# Patient Record
Sex: MALE | Race: WHITE | NOT HISPANIC OR LATINO | Employment: OTHER
[De-identification: names, ages, dates, MRNs, and addresses within clinical notes are randomized per-mention and may not be internally consistent; named-entity substitution may affect disease eponyms.]

---

## 2017-01-26 ENCOUNTER — EXTERNAL RECORD (OUTPATIENT)
Dept: HEALTH INFORMATION MANAGEMENT | Facility: OTHER | Age: 57
End: 2017-01-26

## 2017-03-06 ENCOUNTER — BH HISTORICAL (OUTPATIENT)
Dept: OTHER | Age: 57
End: 2017-03-06

## 2017-07-03 ENCOUNTER — BH HISTORICAL (OUTPATIENT)
Dept: OTHER | Age: 57
End: 2017-07-03

## 2017-11-02 ENCOUNTER — BH HISTORICAL (OUTPATIENT)
Dept: OTHER | Age: 57
End: 2017-11-02

## 2018-04-23 ENCOUNTER — OFFICE VISIT (OUTPATIENT)
Dept: INTERNAL MEDICINE CLINIC | Facility: CLINIC | Age: 58
End: 2018-04-23

## 2018-04-23 VITALS
TEMPERATURE: 98 F | RESPIRATION RATE: 16 BRPM | HEART RATE: 72 BPM | WEIGHT: 189.81 LBS | HEIGHT: 73 IN | DIASTOLIC BLOOD PRESSURE: 82 MMHG | BODY MASS INDEX: 25.16 KG/M2 | SYSTOLIC BLOOD PRESSURE: 134 MMHG

## 2018-04-23 DIAGNOSIS — I48.0 PAROXYSMAL ATRIAL FIBRILLATION (HCC): ICD-10-CM

## 2018-04-23 DIAGNOSIS — Z13.220 SCREENING FOR LIPID DISORDERS: ICD-10-CM

## 2018-04-23 DIAGNOSIS — I10 ESSENTIAL HYPERTENSION, BENIGN: ICD-10-CM

## 2018-04-23 DIAGNOSIS — E11.9 TYPE 2 DIABETES MELLITUS WITHOUT COMPLICATION, WITHOUT LONG-TERM CURRENT USE OF INSULIN (HCC): ICD-10-CM

## 2018-04-23 DIAGNOSIS — J45.20 MILD INTERMITTENT ASTHMA WITHOUT COMPLICATION: ICD-10-CM

## 2018-04-23 DIAGNOSIS — I50.9 CONGESTIVE HEART FAILURE, UNSPECIFIED HF CHRONICITY, UNSPECIFIED HEART FAILURE TYPE (HCC): ICD-10-CM

## 2018-04-23 DIAGNOSIS — L50.9 HIVES: Primary | ICD-10-CM

## 2018-04-23 PROCEDURE — 99214 OFFICE O/P EST MOD 30 MIN: CPT | Performed by: NURSE PRACTITIONER

## 2018-04-23 RX ORDER — AMLODIPINE BESYLATE 2.5 MG/1
1 TABLET ORAL DAILY
Refills: 3 | COMMUNITY
Start: 2018-03-25

## 2018-04-23 RX ORDER — HYDRALAZINE HYDROCHLORIDE 50 MG/1
50 TABLET, FILM COATED ORAL 3 TIMES DAILY
COMMUNITY

## 2018-04-23 RX ORDER — BUPROPION HYDROCHLORIDE 200 MG/1
1 TABLET, EXTENDED RELEASE ORAL 2 TIMES DAILY
Refills: 0 | COMMUNITY
Start: 2018-04-11 | End: 2018-05-15

## 2018-04-23 RX ORDER — CETIRIZINE HYDROCHLORIDE 10 MG/1
10 TABLET ORAL DAILY
Qty: 30 TABLET | Refills: 0 | Status: SHIPPED | OUTPATIENT
Start: 2018-04-23 | End: 2018-12-07

## 2018-04-23 RX ORDER — METFORMIN HYDROCHLORIDE 750 MG/1
750 TABLET, EXTENDED RELEASE ORAL
Qty: 30 TABLET | Refills: 1 | Status: SHIPPED | OUTPATIENT
Start: 2018-04-23 | End: 2018-05-15

## 2018-04-23 NOTE — PROGRESS NOTES
Ethan Middleton is a 66year old male who presents as a new patient to establish. HPI:   Pt complains of approx 3 week history of \"welts\" that appear randomly and then resolve spontaneously after approx 4-12 hours.  Stated areas are red, swollen and 06/04/2008 70   ----------  AST (SGOT) (IU/L)   Date Value   05/20/2010 32   04/28/2009 41 (H)   06/04/2008 30   ----------  AST (U/L)   Date Value   07/09/2014 11 (L)   06/23/2014 16   ----------  ALT (SGPT) (IU/L)   Date Value   05/20/2010 52   04/28/2 Infectious Disease Mother      septicemia   • Diabetes Mother    • Stroke Maternal Grandmother    • Cancer Maternal Grandfather      brain   • Heart Disease Maternal Grandfather    • Heart Disorder Brother      TAA   • Thyroid Disorder Sister       Smoking are clear  NECK: supple,no adenopathy,no bruits  LUNGS: clear to auscultation bilaterally w/o wheezes, rhonchi or rales. No cough heard.  Chest expansion symmetrical.   CARDIO: RRR without murmur   GI: normal BS, soft, no masses, HSM or tenderness  MUSCULOS HCl  MG Oral Tablet 24 Hr 30 tablet 1      Sig: Take 1 tablet (750 mg total) by mouth daily with breakfast.           Imaging & Consults:  None    Return in about 3 weeks (around 5/14/2018). There are no Patient Instructions on file for this visit.

## 2018-05-04 ENCOUNTER — APPOINTMENT (OUTPATIENT)
Dept: LAB | Age: 58
End: 2018-05-04
Attending: NURSE PRACTITIONER
Payer: MEDICAID

## 2018-05-04 DIAGNOSIS — Z13.220 SCREENING FOR LIPID DISORDERS: ICD-10-CM

## 2018-05-04 DIAGNOSIS — E11.9 TYPE 2 DIABETES MELLITUS WITHOUT COMPLICATION, WITHOUT LONG-TERM CURRENT USE OF INSULIN (HCC): ICD-10-CM

## 2018-05-04 PROCEDURE — 82043 UR ALBUMIN QUANTITATIVE: CPT

## 2018-05-04 PROCEDURE — 36415 COLL VENOUS BLD VENIPUNCTURE: CPT

## 2018-05-04 PROCEDURE — 83036 HEMOGLOBIN GLYCOSYLATED A1C: CPT

## 2018-05-04 PROCEDURE — 80061 LIPID PANEL: CPT

## 2018-05-04 PROCEDURE — 80053 COMPREHEN METABOLIC PANEL: CPT

## 2018-05-04 PROCEDURE — 82570 ASSAY OF URINE CREATININE: CPT

## 2018-05-06 ENCOUNTER — HOSPITAL ENCOUNTER (EMERGENCY)
Facility: HOSPITAL | Age: 58
Discharge: HOME | End: 2018-05-06
Attending: EMERGENCY MEDICINE
Payer: MEDICAID

## 2018-05-06 VITALS
HEIGHT: 73 IN | HEART RATE: 74 BPM | TEMPERATURE: 98 F | RESPIRATION RATE: 18 BRPM | OXYGEN SATURATION: 94 % | BODY MASS INDEX: 25.16 KG/M2 | SYSTOLIC BLOOD PRESSURE: 169 MMHG | DIASTOLIC BLOOD PRESSURE: 97 MMHG | WEIGHT: 189.81 LBS

## 2018-05-06 DIAGNOSIS — R21 RASH: Primary | ICD-10-CM

## 2018-05-06 DIAGNOSIS — M79.89 SWELLING OF LEFT HAND: ICD-10-CM

## 2018-05-06 PROCEDURE — 99284 EMERGENCY DEPT VISIT MOD MDM: CPT

## 2018-05-06 PROCEDURE — 96365 THER/PROPH/DIAG IV INF INIT: CPT

## 2018-05-06 PROCEDURE — 85025 COMPLETE CBC W/AUTO DIFF WBC: CPT | Performed by: EMERGENCY MEDICINE

## 2018-05-06 RX ORDER — SULFAMETHOXAZOLE AND TRIMETHOPRIM 800; 160 MG/1; MG/1
1 TABLET ORAL 2 TIMES DAILY
Qty: 14 TABLET | Refills: 0 | Status: SHIPPED | OUTPATIENT
Start: 2018-05-06 | End: 2018-05-16

## 2018-05-06 RX ORDER — CEFAZOLIN SODIUM 1 G/50ML
1 INJECTION, SOLUTION INTRAVENOUS ONCE
Status: COMPLETED | OUTPATIENT
Start: 2018-05-06 | End: 2018-05-06

## 2018-05-06 NOTE — ED PROVIDER NOTES
Patient Seen in: [facility] Emergency Department    History   Patient presents with:  Upper Extremity Injury (musculoskeletal)    Stated Complaint: swollen hand    HPI    Russell Fisher is a 53-year-old male presenting to the emergency department for [facility] HEENT exam is normal lungs are clear cardiovascular exam shows regular rhythm abdomen soft nontender patient's left upper extremity exam hand exam edematous slight erythema of dorsum of the hand some blistering skin in the second webspace and at the first tablet Refills: 0

## 2018-05-06 NOTE — ED INITIAL ASSESSMENT (HPI)
Pt w/ L hand swollen and red since yesterday. Denies any type of trauma. Reports having rashes come up and disappear for the last 3 weeks. Pt w/ hx of MRSA 7 years ago.

## 2018-05-15 ENCOUNTER — OFFICE VISIT (OUTPATIENT)
Dept: INTERNAL MEDICINE CLINIC | Facility: CLINIC | Age: 58
End: 2018-05-15

## 2018-05-15 VITALS
DIASTOLIC BLOOD PRESSURE: 80 MMHG | HEIGHT: 75 IN | TEMPERATURE: 98 F | RESPIRATION RATE: 14 BRPM | SYSTOLIC BLOOD PRESSURE: 124 MMHG | WEIGHT: 191 LBS | HEART RATE: 72 BPM | BODY MASS INDEX: 23.75 KG/M2

## 2018-05-15 DIAGNOSIS — L50.9 HIVES: ICD-10-CM

## 2018-05-15 DIAGNOSIS — M79.89 SWELLING OF LEFT HAND: Primary | ICD-10-CM

## 2018-05-15 DIAGNOSIS — F32.A DEPRESSION, UNSPECIFIED DEPRESSION TYPE: ICD-10-CM

## 2018-05-15 DIAGNOSIS — E11.9 TYPE 2 DIABETES MELLITUS WITHOUT COMPLICATION, WITHOUT LONG-TERM CURRENT USE OF INSULIN (HCC): ICD-10-CM

## 2018-05-15 PROCEDURE — 99214 OFFICE O/P EST MOD 30 MIN: CPT | Performed by: NURSE PRACTITIONER

## 2018-05-15 RX ORDER — BUPROPION HYDROCHLORIDE 200 MG/1
200 TABLET, EXTENDED RELEASE ORAL 2 TIMES DAILY
Qty: 60 TABLET | Refills: 0 | Status: SHIPPED | OUTPATIENT
Start: 2018-05-15 | End: 2018-06-17

## 2018-05-15 RX ORDER — METFORMIN HYDROCHLORIDE 750 MG/1
750 TABLET, EXTENDED RELEASE ORAL 2 TIMES DAILY WITH MEALS
Qty: 60 TABLET | Refills: 2 | Status: SHIPPED | OUTPATIENT
Start: 2018-05-15 | End: 2018-11-07

## 2018-05-15 NOTE — PROGRESS NOTES
Patient presents with:  Lab Results: AB RM 7  ER F/U: Pt was seen in ER on 05/06/2018 hand rash       HPI:  Presents for follow up of ER visit on 5/6/18 for swelling of bilat hands, L>R.  Stated left hand started swelling and having red blisters to webbing exertion  MUSCULOSKELETAL: denies back pain  NEURO: denies headaches  PSYCH: as above    Past Medical History:   Diagnosis Date   • ALLERGIC RHINITIS    • Anxiety    • ASTHMA    • Congestive heart disease ([facility] [address]    • DEPRESSION    • DIABETES    • HYPERTENSI 0       Physical Exam  /80   Pulse 72   Temp 97.8 °F (36.6 °C) (Oral)   Resp 14   Ht 75\"   Wt 191 lb   BMI 23.87 kg/m²   Constitutional: well developed, well nourished,in no apparent distress  HEENT: Normocephalic and atraumatic.    Eyes: Conjunctiva Finger stick route daily. Signed Prescriptions Disp Refills    BuPROPion HCl ER, SR, 200 MG Oral Tablet 12 Hr 60 tablet 0      Sig: Take 1 tablet (200 mg total) by mouth 2 (two) times daily.       MetFORMIN HCl  MG Oral Tablet 24 Hr 60 tablet 2

## 2018-06-18 RX ORDER — BUPROPION HYDROCHLORIDE 200 MG/1
TABLET, EXTENDED RELEASE ORAL
Qty: 60 TABLET | Refills: 0 | Status: SHIPPED | OUTPATIENT
Start: 2018-06-18 | End: 2018-07-18

## 2018-06-18 NOTE — TELEPHONE ENCOUNTER
LOV: 05/15/2018  Future Visit: 06/26/2018  Last Labs: 05/04/2018, COMP, Lipid panel, Micro, CBC   Last Rx: 05/15/2018    Per protocol: sent to provider

## 2018-06-20 ENCOUNTER — TELEPHONE (OUTPATIENT)
Dept: INTERNAL MEDICINE CLINIC | Facility: CLINIC | Age: 58
End: 2018-06-20

## 2018-06-25 ENCOUNTER — TELEPHONE (OUTPATIENT)
Dept: INTERNAL MEDICINE CLINIC | Facility: CLINIC | Age: 58
End: 2018-06-25

## 2018-06-25 NOTE — TELEPHONE ENCOUNTER
Received a message via [facility] on 6/23/18 at 3:07 pm stating he cancelled his appointment:    Reason for Cancellation: Canceled via MyChart      Patient Comments:     Mandy Tellez

## 2018-07-18 RX ORDER — BUPROPION HYDROCHLORIDE 200 MG/1
TABLET, EXTENDED RELEASE ORAL
Qty: 60 TABLET | Refills: 0 | Status: SHIPPED | OUTPATIENT
Start: 2018-07-18 | End: 2018-08-23

## 2018-07-18 NOTE — TELEPHONE ENCOUNTER
LOV: 5/15/18 JV  FOV: none on file   LAST RX: 6/18/18 Bupropion HCL ER, 1 tablet by mouth bid 60 tablets 0 refills.   LAST LABS:5/6/18 cbc  5/4/18 microalb,A1C,lipid,cmp  PER PROTOCOL: to provider

## 2018-07-27 NOTE — TELEPHONE ENCOUNTER
ZULMATCSHAQUILLE and sched fup-has only seen JV-does he want to go to [facility] to see him or stay here and see someone else?

## 2018-08-23 RX ORDER — BUPROPION HYDROCHLORIDE 200 MG/1
TABLET, EXTENDED RELEASE ORAL
Qty: 60 TABLET | Refills: 0 | Status: SHIPPED | OUTPATIENT
Start: 2018-08-23 | End: 2018-09-21

## 2018-08-23 NOTE — TELEPHONE ENCOUNTER
LOV: 05/15/2018 JV   Future Visit: 09/12/2018 JL  Last Labs: 05/04/2018 Micro, Hemoglobin A1C, Lipid panel, COMP  Last Rx: 07/18/2018    Per protocol

## 2018-09-21 RX ORDER — BUPROPION HYDROCHLORIDE 200 MG/1
TABLET, EXTENDED RELEASE ORAL
Qty: 60 TABLET | Refills: 0 | Status: SHIPPED | OUTPATIENT
Start: 2018-09-21 | End: 2018-10-26

## 2018-09-21 NOTE — TELEPHONE ENCOUNTER
LOV-5/15/18 JV  FOV-12/10/18 JL  LAST RX-8/23/18 60 tabs 0 refills  LAST LABS-5/4/18  PER PROTOCOL-to provider

## 2018-09-25 NOTE — TELEPHONE ENCOUNTER
Future Appointments   Date Time Provider Tonia Erica   12/10/2018 11:45 AM Julita Alvarado MD [facility]

## 2018-10-26 RX ORDER — BUPROPION HYDROCHLORIDE 200 MG/1
TABLET, EXTENDED RELEASE ORAL
Qty: 60 TABLET | Refills: 1 | Status: SHIPPED | OUTPATIENT
Start: 2018-10-26 | End: 2019-01-22

## 2018-10-26 NOTE — TELEPHONE ENCOUNTER
Last Office Visit: 5-15-18 with Russell Walls for test results  Last Rx Filled: 9-21-18 60 tabs with no refills  Last Labs: 5-6-18 cbc/ 5-4-18 urine microalbumin/hga1c/lipid/cmp  Future Appointment: 12-10-18    Per protocol to provider

## 2018-11-07 RX ORDER — METFORMIN HYDROCHLORIDE 750 MG/1
TABLET, EXTENDED RELEASE ORAL
Qty: 60 TABLET | Refills: 0 | Status: SHIPPED | OUTPATIENT
Start: 2018-11-07 | End: 2019-01-04 | Stop reason: ALTCHOICE

## 2018-11-07 NOTE — TELEPHONE ENCOUNTER
Did not pass protocol- last A1c was 12.2 on 5/4/18  Pt hs upcoming appt on 12/10/18 w/ FABIEN  Per protocol routed to provider

## 2018-12-04 ENCOUNTER — APPOINTMENT (OUTPATIENT)
Dept: LAB | Age: 58
End: 2018-12-04
Attending: NURSE PRACTITIONER
Payer: MEDICAID

## 2018-12-04 DIAGNOSIS — E11.9 TYPE 2 DIABETES MELLITUS WITHOUT COMPLICATION, WITHOUT LONG-TERM CURRENT USE OF INSULIN (HCC): ICD-10-CM

## 2018-12-04 PROCEDURE — 36415 COLL VENOUS BLD VENIPUNCTURE: CPT

## 2018-12-04 PROCEDURE — 83036 HEMOGLOBIN GLYCOSYLATED A1C: CPT

## 2018-12-05 ENCOUNTER — PATIENT MESSAGE (OUTPATIENT)
Dept: INTERNAL MEDICINE CLINIC | Facility: CLINIC | Age: 58
End: 2018-12-05

## 2018-12-05 NOTE — TELEPHONE ENCOUNTER
From: Moo Mederos RN  To: Gricelda Le  Sent: 12/5/2018 9:06 AM CST  Subject: Diabetic Appointment    Hi Irena Jackson,    I apologize for sending a message and not calling but our phone lines are down and we cannot place outgoing nor receive incoming ph

## 2018-12-05 NOTE — TELEPHONE ENCOUNTER
Scheduled pt for Friday 12/7 at 0930 for DM visit per FABIEN. Pt is new to [facility] however appt time was approved by [facility] and also d/w MECCA.

## 2018-12-07 ENCOUNTER — OFFICE VISIT (OUTPATIENT)
Dept: ENDOCRINOLOGY CLINIC | Facility: CLINIC | Age: 58
End: 2018-12-07
Payer: MEDICAID

## 2018-12-07 VITALS
TEMPERATURE: 98 F | WEIGHT: 189 LBS | HEIGHT: 74 IN | SYSTOLIC BLOOD PRESSURE: 120 MMHG | DIASTOLIC BLOOD PRESSURE: 68 MMHG | BODY MASS INDEX: 24.26 KG/M2 | HEART RATE: 68 BPM | RESPIRATION RATE: 20 BRPM

## 2018-12-07 DIAGNOSIS — J30.2 SEASONAL ALLERGIES: ICD-10-CM

## 2018-12-07 DIAGNOSIS — Z23 NEED FOR INFLUENZA VACCINATION: ICD-10-CM

## 2018-12-07 DIAGNOSIS — I10 ESSENTIAL HYPERTENSION, BENIGN: ICD-10-CM

## 2018-12-07 DIAGNOSIS — E11.9 TYPE 2 DIABETES MELLITUS WITHOUT COMPLICATION, WITHOUT LONG-TERM CURRENT USE OF INSULIN (HCC): Primary | ICD-10-CM

## 2018-12-07 PROCEDURE — 90471 IMMUNIZATION ADMIN: CPT | Performed by: NURSE PRACTITIONER

## 2018-12-07 PROCEDURE — 99214 OFFICE O/P EST MOD 30 MIN: CPT | Performed by: NURSE PRACTITIONER

## 2018-12-07 PROCEDURE — 90686 IIV4 VACC NO PRSV 0.5 ML IM: CPT | Performed by: NURSE PRACTITIONER

## 2018-12-07 RX ORDER — CETIRIZINE HYDROCHLORIDE 10 MG/1
10 TABLET ORAL DAILY
Qty: 30 TABLET | Refills: 2 | Status: SHIPPED | OUTPATIENT
Start: 2018-12-07 | End: 2019-08-26

## 2018-12-07 NOTE — PATIENT INSTRUCTIONS
We are here to support you with Diabetes but please remember that you still need your primary care doctor for your routine health maintenance. Your current A1C: 9.6%  This test provides us with your average blood sugar for the past 3 months.    The main g diabetes    Health Maintenance:   1. LABS: It is important to monitor your kidney function (blood and urine protein levels) , liver function tests and cholesterol levels when you have diabetes.      2. FOOT EXAMS:  daily foot inspections for foot wounds or

## 2018-12-07 NOTE — PROGRESS NOTES
HPI:   Jennifer Judd is a 66year old male who presents for his type 2 diabetes. Patient did not bring glucose logs or glucometer for review. Patient states that he has been attempting to control blood glucose with diet.   He is very active with his valeria DIABETES, HYPERTENSION, MRSA (methicillin resistant staph aureus) culture positive (5/2010), Thoracic aneurysm, Type I (juvenile type) diabetes mellitus without mention of complication, not stated as uncontrolled, Umbilical hernia, and Unspecified essentia 05/20/2010 10:00 AM    CA 8.5 05/04/2018 08:43 AM    ALKPHO 100 05/04/2018 08:43 AM    AST 16 05/04/2018 08:43 AM    ALT 25 05/04/2018 08:43 AM    BILT 0.6 05/04/2018 08:43 AM    TP 7.2 05/04/2018 08:43 AM    ALB 3.5 05/04/2018 08:43 AM     05/04/201 compliant, needs to follow diet more regularly. Recommendations are: continue present meds, see ophthalmology soon, check feet daily and will add  Jardiance at a dose of 10mg po daily.     Discussed self monitoring blood glucose and the importance of mo 1/4/2019).        [address] Miguel RANDHAWA, FLORENCIOE

## 2018-12-13 ENCOUNTER — DIABETIC EDUCATION (OUTPATIENT)
Dept: ENDOCRINOLOGY CLINIC | Facility: CLINIC | Age: 58
End: 2018-12-13
Payer: MEDICAID

## 2018-12-13 DIAGNOSIS — E11.9 TYPE 2 DIABETES MELLITUS WITHOUT COMPLICATION, WITHOUT LONG-TERM CURRENT USE OF INSULIN (HCC): Primary | ICD-10-CM

## 2018-12-13 PROCEDURE — 99211 OFF/OP EST MAY X REQ PHY/QHP: CPT | Performed by: DIETITIAN, REGISTERED

## 2018-12-13 NOTE — PROGRESS NOTES
Maynor SAUNDERS 3/7/1960 was seen for Diabetic Medical Nutrition Therapy:    Date: 2018  Start time: 9:00 End time: 9:45    Reviewed diet: he doesn't seem to over-eat starch.     Reviewed HgbA1C and average glucose level as well as fasting gluco

## 2019-01-04 ENCOUNTER — OFFICE VISIT (OUTPATIENT)
Dept: ENDOCRINOLOGY CLINIC | Facility: CLINIC | Age: 59
End: 2019-01-04
Payer: MEDICAID

## 2019-01-04 VITALS
WEIGHT: 185 LBS | DIASTOLIC BLOOD PRESSURE: 80 MMHG | RESPIRATION RATE: 20 BRPM | SYSTOLIC BLOOD PRESSURE: 108 MMHG | HEART RATE: 70 BPM | TEMPERATURE: 98 F | BODY MASS INDEX: 23.74 KG/M2 | HEIGHT: 74 IN

## 2019-01-04 DIAGNOSIS — E11.9 TYPE 2 DIABETES MELLITUS WITHOUT COMPLICATION, WITHOUT LONG-TERM CURRENT USE OF INSULIN (HCC): Primary | ICD-10-CM

## 2019-01-04 PROCEDURE — 99214 OFFICE O/P EST MOD 30 MIN: CPT | Performed by: NURSE PRACTITIONER

## 2019-01-04 RX ORDER — METFORMIN HYDROCHLORIDE 500 MG/1
1000 TABLET, EXTENDED RELEASE ORAL 2 TIMES DAILY WITH MEALS
Qty: 30 TABLET | Refills: 5 | Status: SHIPPED | OUTPATIENT
Start: 2019-01-04 | End: 2019-08-05

## 2019-01-04 NOTE — PROGRESS NOTES
HPI:   Van Holbrook is a 66year old male who presents for recheck of his type 2 diabetes. Patient met with outpatient dietitian for meal planning, portion control and carbohydrate consistency education.   Patient brought glucometer to appointment for has never used smokeless tobacco. He reports that he drinks about 0.6 oz of alcohol per week. He reports that he does not use drugs. He has No Known Allergies.      Current Outpatient Medications on File Prior to Visit:  cetirizine 10 MG Oral Tab Take 1 05/04/2018 08:43 AM    NONHDLC 93 05/04/2018 08:43 AM          Diabetes  (most recent labs)   Lab Results   Component Value Date/Time    A1C 9.6 (H) 12/04/2018 08:57 AM          Microalb (most recent labs)   Lab Results   Component Value Date/Time    MICRO diet more regularly. Recommendations are: see ophthalmology soon, check feet daily and meet with podiatry as ordered last visit. .    Patient to increase Metformin ER to 1000mg po bid and increase Jardiance to 25mg po daily.   Reinforced drinking 6-8 8oz

## 2019-01-07 ENCOUNTER — TELEPHONE (OUTPATIENT)
Dept: ENDOCRINOLOGY CLINIC | Facility: CLINIC | Age: 59
End: 2019-01-07

## 2019-01-07 DIAGNOSIS — E11.9 TYPE 2 DIABETES MELLITUS WITHOUT COMPLICATION, WITHOUT LONG-TERM CURRENT USE OF INSULIN (HCC): Primary | ICD-10-CM

## 2019-01-07 NOTE — TELEPHONE ENCOUNTER
Received fax from [employer] PA started/sent to plan for Jardiance 25 mg Key [id number]. Await response.

## 2019-01-09 NOTE — TELEPHONE ENCOUNTER
Prefer not to prescribe Steglatro due to patients hx of CHF and neuropathy. Patient has some loss of protection senses in lower extremities which ncreases risk of lower extremity amputation if wound occurs.   Please see what information is needed to increa

## 2019-01-09 NOTE — TELEPHONE ENCOUNTER
Faxed appeal letter to [id number]  H .1 C/Tarun Esteban Final for Jardiance denial.  See 'letters' tab. Added Member name, member ID, case record and . Confirmation received.

## 2019-01-23 RX ORDER — BUPROPION HYDROCHLORIDE 200 MG/1
TABLET, EXTENDED RELEASE ORAL
Qty: 60 TABLET | Refills: 1 | Status: SHIPPED | OUTPATIENT
Start: 2019-01-23 | End: 2019-04-22

## 2019-01-23 NOTE — TELEPHONE ENCOUNTER
Called [employer] to [facility] on status of appeal letter which was sent 2 weeks ago on 1/9. They said they have received it and it is still under review. Call reference # [id number]. They will fax us a result when determination has been reached.

## 2019-01-23 NOTE — TELEPHONE ENCOUNTER
LOV: 1/4/19 w/ DH for DM, 5/15/18 w/ JV  FOV: 2/4/19 w/ DH  Last labs: 12/4/18 A1c  Last Refill: 10/26/18 qt:60 1 refill    Per protocol sent to provider

## 2019-02-02 ENCOUNTER — TELEPHONE (OUTPATIENT)
Dept: INTERNAL MEDICINE CLINIC | Facility: CLINIC | Age: 59
End: 2019-02-02

## 2019-02-02 NOTE — TELEPHONE ENCOUNTER
Appointment canceled for Susanna Queen ([id number])   Visit Type: EDIS/MD DIABETIC FOLLOW UP   Date        Time      Length    Provider                  Department   2/4/2019     8:00 AM  45 mins.  Danica Marie APN        [facility]         Reas

## 2019-02-05 NOTE — TELEPHONE ENCOUNTER
Received letter from [facility] requesting that pt sign Authorized [employer] form and mail to them within 15 days or case will be closed.   LM on pt's cell of same and mailed completed form requesting his signature, also enclosed envelope address

## 2019-04-18 ENCOUNTER — EXTERNAL RECORD (OUTPATIENT)
Dept: HEALTH INFORMATION MANAGEMENT | Facility: OTHER | Age: 59
End: 2019-04-18

## 2019-04-23 RX ORDER — BUPROPION HYDROCHLORIDE 200 MG/1
TABLET, EXTENDED RELEASE ORAL
Qty: 60 TABLET | Refills: 0 | Status: SHIPPED | OUTPATIENT
Start: 2019-04-23 | End: 2019-06-19

## 2019-04-23 NOTE — TELEPHONE ENCOUNTER
LOV:1/4/19   FOV:none on file   LAST RX:1/23/19 200 mg take 1 tab twice a day 60 tabs 1 refill   LAST LABS:12/4/18 a1c  PER PROTOCOL:to provider

## 2019-04-23 NOTE — TELEPHONE ENCOUNTER
I have never seen pt, I do not see that he has ever had pe here, please schedule asap with me or IM apc

## 2019-06-19 ENCOUNTER — TELEPHONE (OUTPATIENT)
Dept: INTERNAL MEDICINE CLINIC | Facility: CLINIC | Age: 59
End: 2019-06-19

## 2019-06-19 DIAGNOSIS — Z13.0 SCREENING FOR DISORDER OF BLOOD AND BLOOD-FORMING ORGANS: ICD-10-CM

## 2019-06-19 DIAGNOSIS — Z00.00 ROUTINE GENERAL MEDICAL EXAMINATION AT A HEALTH CARE FACILITY: Primary | ICD-10-CM

## 2019-06-19 DIAGNOSIS — Z13.220 SCREENING FOR LIPID DISORDERS: ICD-10-CM

## 2019-06-19 DIAGNOSIS — Z13.228 SCREENING FOR METABOLIC DISORDER: ICD-10-CM

## 2019-06-19 DIAGNOSIS — Z12.5 SCREENING FOR MALIGNANT NEOPLASM OF PROSTATE: ICD-10-CM

## 2019-06-19 RX ORDER — BUPROPION HYDROCHLORIDE 200 MG/1
TABLET, EXTENDED RELEASE ORAL
Qty: 60 TABLET | Refills: 1 | Status: SHIPPED | OUTPATIENT
Start: 2019-06-19 | End: 2019-08-28

## 2019-06-19 NOTE — TELEPHONE ENCOUNTER
Last Office Visit: 1-4-19 with [facility] for diabetes  Last Rx Filled: 4-23-19 60 tabs with no refills   Last Labs: 12-4-18 hga1c. 5-6-18 cbc. 5-4-18 cmp/lipid/hga1c/urine microalbumin  Future Appointment: 8-5-19    Per protocol to provider

## 2019-06-19 NOTE — TELEPHONE ENCOUNTER
Please place fasting lab orders through [facility] for upcoming physcial  Future Appointments   Date Time Provider Tonia Maldonado   8/5/2019  2:45 PM Familia Freeman MD [facility]

## 2019-07-26 ENCOUNTER — LAB ENCOUNTER (OUTPATIENT)
Dept: LAB | Age: 59
End: 2019-07-26
Attending: INTERNAL MEDICINE
Payer: MEDICAID

## 2019-07-26 DIAGNOSIS — Z00.00 ROUTINE GENERAL MEDICAL EXAMINATION AT A HEALTH CARE FACILITY: ICD-10-CM

## 2019-07-26 DIAGNOSIS — E11.9 TYPE 2 DIABETES MELLITUS WITHOUT COMPLICATION, WITHOUT LONG-TERM CURRENT USE OF INSULIN (HCC): Primary | ICD-10-CM

## 2019-07-26 DIAGNOSIS — Z13.228 SCREENING FOR METABOLIC DISORDER: ICD-10-CM

## 2019-07-26 DIAGNOSIS — Z12.5 SCREENING FOR MALIGNANT NEOPLASM OF PROSTATE: ICD-10-CM

## 2019-07-26 DIAGNOSIS — Z13.220 SCREENING FOR LIPID DISORDERS: ICD-10-CM

## 2019-07-26 DIAGNOSIS — Z13.0 SCREENING FOR DISORDER OF BLOOD AND BLOOD-FORMING ORGANS: ICD-10-CM

## 2019-07-26 LAB
ALBUMIN SERPL-MCNC: 3.5 G/DL (ref 3.4–5)
ALBUMIN/GLOB SERPL: 0.9 {RATIO} (ref 1–2)
ALP LIVER SERPL-CCNC: 99 U/L (ref 45–117)
ALT SERPL-CCNC: 14 U/L (ref 16–61)
ANION GAP SERPL CALC-SCNC: 4 MMOL/L (ref 0–18)
AST SERPL-CCNC: 13 U/L (ref 15–37)
BASOPHILS # BLD AUTO: 0.05 X10(3) UL (ref 0–0.2)
BASOPHILS NFR BLD AUTO: 0.7 %
BILIRUB SERPL-MCNC: 0.6 MG/DL (ref 0.1–2)
BUN BLD-MCNC: 16 MG/DL (ref 7–18)
BUN/CREAT SERPL: 11.9 (ref 10–20)
CALCIUM BLD-MCNC: 9 MG/DL (ref 8.5–10.1)
CHLORIDE SERPL-SCNC: 100 MMOL/L (ref 98–112)
CHOLEST SMN-MCNC: 135 MG/DL (ref ?–200)
CO2 SERPL-SCNC: 30 MMOL/L (ref 21–32)
COMPLEXED PSA SERPL-MCNC: 1.23 NG/ML (ref ?–4)
CREAT BLD-MCNC: 1.34 MG/DL (ref 0.7–1.3)
DEPRECATED RDW RBC AUTO: 36.9 FL (ref 35.1–46.3)
EOSINOPHIL # BLD AUTO: 0.27 X10(3) UL (ref 0–0.7)
EOSINOPHIL NFR BLD AUTO: 3.6 %
ERYTHROCYTE [DISTWIDTH] IN BLOOD BY AUTOMATED COUNT: 11.5 % (ref 11–15)
GLOBULIN PLAS-MCNC: 3.7 G/DL (ref 2.8–4.4)
GLUCOSE BLD-MCNC: 336 MG/DL (ref 70–99)
HCT VFR BLD AUTO: 47.7 % (ref 39–53)
HDLC SERPL-MCNC: 43 MG/DL (ref 40–59)
HGB BLD-MCNC: 17.1 G/DL (ref 13–17.5)
IMM GRANULOCYTES # BLD AUTO: 0.02 X10(3) UL (ref 0–1)
IMM GRANULOCYTES NFR BLD: 0.3 %
LDLC SERPL CALC-MCNC: 70 MG/DL (ref ?–100)
LYMPHOCYTES # BLD AUTO: 2.21 X10(3) UL (ref 1–4)
LYMPHOCYTES NFR BLD AUTO: 29.5 %
M PROTEIN MFR SERPL ELPH: 7.2 G/DL (ref 6.4–8.2)
MCH RBC QN AUTO: 31.4 PG (ref 26–34)
MCHC RBC AUTO-ENTMCNC: 35.8 G/DL (ref 31–37)
MCV RBC AUTO: 87.5 FL (ref 80–100)
MONOCYTES # BLD AUTO: 0.45 X10(3) UL (ref 0.1–1)
MONOCYTES NFR BLD AUTO: 6 %
NEUTROPHILS # BLD AUTO: 4.48 X10 (3) UL (ref 1.5–7.7)
NEUTROPHILS # BLD AUTO: 4.48 X10(3) UL (ref 1.5–7.7)
NEUTROPHILS NFR BLD AUTO: 59.9 %
NONHDLC SERPL-MCNC: 92 MG/DL (ref ?–130)
OSMOLALITY SERPL CALC.SUM OF ELEC: 292 MOSM/KG (ref 275–295)
PLATELET # BLD AUTO: 220 10(3)UL (ref 150–450)
POTASSIUM SERPL-SCNC: 4.6 MMOL/L (ref 3.5–5.1)
RBC # BLD AUTO: 5.45 X10(6)UL (ref 4.3–5.7)
SODIUM SERPL-SCNC: 134 MMOL/L (ref 136–145)
TRIGL SERPL-MCNC: 112 MG/DL (ref 30–149)
VLDLC SERPL CALC-MCNC: 22 MG/DL (ref 0–30)
WBC # BLD AUTO: 7.5 X10(3) UL (ref 4–11)

## 2019-07-26 PROCEDURE — 85025 COMPLETE CBC W/AUTO DIFF WBC: CPT

## 2019-07-26 PROCEDURE — 80061 LIPID PANEL: CPT

## 2019-07-26 PROCEDURE — 36415 COLL VENOUS BLD VENIPUNCTURE: CPT

## 2019-07-26 PROCEDURE — 80053 COMPREHEN METABOLIC PANEL: CPT

## 2019-08-05 ENCOUNTER — OFFICE VISIT (OUTPATIENT)
Dept: INTERNAL MEDICINE CLINIC | Facility: CLINIC | Age: 59
End: 2019-08-05
Payer: MEDICAID

## 2019-08-05 VITALS
HEART RATE: 72 BPM | HEIGHT: 73.43 IN | SYSTOLIC BLOOD PRESSURE: 136 MMHG | RESPIRATION RATE: 18 BRPM | DIASTOLIC BLOOD PRESSURE: 84 MMHG | WEIGHT: 181 LBS | TEMPERATURE: 98 F | BODY MASS INDEX: 23.48 KG/M2

## 2019-08-05 DIAGNOSIS — Z12.11 SPECIAL SCREENING FOR MALIGNANT NEOPLASMS, COLON: ICD-10-CM

## 2019-08-05 DIAGNOSIS — F32.A DEPRESSION, UNSPECIFIED DEPRESSION TYPE: ICD-10-CM

## 2019-08-05 DIAGNOSIS — E11.9 TYPE 2 DIABETES MELLITUS WITHOUT COMPLICATION, WITHOUT LONG-TERM CURRENT USE OF INSULIN (HCC): Primary | ICD-10-CM

## 2019-08-05 DIAGNOSIS — Z00.00 ROUTINE GENERAL MEDICAL EXAMINATION AT A HEALTH CARE FACILITY: ICD-10-CM

## 2019-08-05 DIAGNOSIS — I10 ESSENTIAL HYPERTENSION, BENIGN: ICD-10-CM

## 2019-08-05 LAB
CARTRIDGE LOT#: 981 NUMERIC
HEMOGLOBIN A1C: 13.7 % (ref 4.3–5.6)

## 2019-08-05 PROCEDURE — 83036 HEMOGLOBIN GLYCOSYLATED A1C: CPT | Performed by: INTERNAL MEDICINE

## 2019-08-05 PROCEDURE — 99396 PREV VISIT EST AGE 40-64: CPT | Performed by: INTERNAL MEDICINE

## 2019-08-05 RX ORDER — PEN NEEDLE, DIABETIC 30 GX3/16"
1 NEEDLE, DISPOSABLE MISCELLANEOUS DAILY
Qty: 100 EACH | Refills: 0 | Status: SHIPPED | OUTPATIENT
Start: 2019-08-05 | End: 2019-08-20 | Stop reason: ALTCHOICE

## 2019-08-05 RX ORDER — METFORMIN HYDROCHLORIDE 500 MG/1
1000 TABLET, EXTENDED RELEASE ORAL 2 TIMES DAILY WITH MEALS
Qty: 120 TABLET | Refills: 0 | Status: SHIPPED | OUTPATIENT
Start: 2019-08-05 | End: 2019-08-20

## 2019-08-05 NOTE — PROGRESS NOTES
Instructed pt on 10 units Lantus with sample pen. Gave sample pen needles. Discussed give long-acting insulin the same time every day, site rotation, storage and expiration of insulin, prime each dose with 2 units, sharp disposal and BG targets.     Pen nee diabetes mellitus without complication, without long-term current use of insulin (hcc)  (primary encounter diagnosis)  Depression, unspecified depression type  Special screening for malignant neoplasms, colon    Orders Placed This Encounter      Hgb A1C

## 2019-08-06 ENCOUNTER — OFFICE VISIT (OUTPATIENT)
Dept: ENDOCRINOLOGY CLINIC | Facility: CLINIC | Age: 59
End: 2019-08-06
Payer: MEDICAID

## 2019-08-06 ENCOUNTER — PATIENT MESSAGE (OUTPATIENT)
Dept: ENDOCRINOLOGY CLINIC | Facility: CLINIC | Age: 59
End: 2019-08-06

## 2019-08-06 VITALS
BODY MASS INDEX: 23.74 KG/M2 | HEART RATE: 70 BPM | RESPIRATION RATE: 20 BRPM | DIASTOLIC BLOOD PRESSURE: 80 MMHG | WEIGHT: 183 LBS | SYSTOLIC BLOOD PRESSURE: 100 MMHG | HEIGHT: 73.5 IN

## 2019-08-06 DIAGNOSIS — E11.65 TYPE 2 DIABETES MELLITUS WITH HYPERGLYCEMIA, WITH LONG-TERM CURRENT USE OF INSULIN (HCC): Primary | ICD-10-CM

## 2019-08-06 DIAGNOSIS — Z79.4 TYPE 2 DIABETES MELLITUS WITH HYPERGLYCEMIA, WITH LONG-TERM CURRENT USE OF INSULIN (HCC): Primary | ICD-10-CM

## 2019-08-06 PROCEDURE — 95249 CONT GLUC MNTR PT PROV EQP: CPT | Performed by: NURSE PRACTITIONER

## 2019-08-06 PROCEDURE — 99214 OFFICE O/P EST MOD 30 MIN: CPT | Performed by: NURSE PRACTITIONER

## 2019-08-06 RX ORDER — METFORMIN HYDROCHLORIDE 750 MG/1
TABLET, EXTENDED RELEASE ORAL
Refills: 0 | COMMUNITY
Start: 2019-04-23 | End: 2019-08-06

## 2019-08-06 RX ORDER — FLASH GLUCOSE SENSOR
KIT MISCELLANEOUS
Qty: 2 EACH | Refills: 1 | Status: SHIPPED | OUTPATIENT
Start: 2019-08-06 | End: 2020-07-21

## 2019-08-06 RX ORDER — GLIMEPIRIDE 4 MG/1
TABLET ORAL
Qty: 30 TABLET | Refills: 2 | Status: SHIPPED | OUTPATIENT
Start: 2019-08-06 | End: 2019-08-20

## 2019-08-06 NOTE — ASSESSMENT & PLAN NOTE
A1C :   Lab Results   Component Value Date     (H) 12/04/2018    A1C 13.7 (A) 08/05/2019     Weight: 183 lb   Diabetes control is poor .   Recommendations:       Reviewed w patient pathophysiology of diabetes, clinical signifiance of A1c, adverse eff

## 2019-08-06 NOTE — PROGRESS NOTES
HPI:    Patient ID: Guilherme Love is a 64year old male.     HPI  Here for pe, way overdue for uncontrolled dm, stopped meds, not checking sugars, needs new psychiatrist for detpresison, doing ok, feels ok  /84 (BP Location: Right arm, Patient Posi Rfl:    spironolactone (ALDACTONE) 25 MG Oral Tab Take 0.5 tablets (12.5 mg total) by mouth daily. Disp: 45 tablet Rfl: 0   lisinopril (PRINIVIL,ZESTRIL) 40 MG Oral Tab Take 1 tablet (40 mg total) by mouth once daily.  Disp: 90 tablet Rfl: 0   carvedilol (C [E]      Meds This Visit:  Requested Prescriptions     Signed Prescriptions Disp Refills   • metFORMIN HCl  MG Oral Tablet 24 Hr 120 tablet 0     Sig: Take 2 tablets (1,000 mg total) by mouth 2 (two) times daily with meals.    • insulin glargine (LANT

## 2019-08-06 NOTE — PATIENT INSTRUCTIONS
We are here to support you with Diabetes but please remember that you still need your primary care doctor for your routine health maintenance. Your A1C: 13.7%   We will work together on improving your blood sugar trends.    I think your beta cells are in sugar testing:    We will see if the continuous glucose sensor covered   But in meantime, we have a free reader and 1 14 d sensor   The continuous glucose sensor has been placed on the back of your arm to measures and stores your sugar levels in the device EVERY year since these changes occur in the BACK of the eye and not visible by you. Please let me know if you need provider list for eye doctor.    Thank you   Vikas Muhammad  [phone]

## 2019-08-06 NOTE — PROGRESS NOTES
Patient presents with:  Diabetes: room-17 cf. pt forgot meter. HPI:   Lesli Duffy is a 64year old male presenting for type  DM medication management.  In the past 3m, DM control has been poorly controlled as evidenced by A1C 13.7% on 8-5-2019   Admits he l known allergies.     Past Medical History:   Diagnosis Date   • ALLERGIC RHINITIS    • Anxiety    • ASTHMA    • Congestive heart disease (HCC)    • DEPRESSION    • DIABETES    • HYPERTENSION    • MRSA (methicillin resistant staph aureus) culture positive 5/ TWICE DAILY Disp: 60 tablet Rfl: 1   cetirizine 10 MG Oral Tab Take 1 tablet (10 mg total) by mouth daily. Disp: 30 tablet Rfl: 2   Glucose Blood (MARIAH CONTOUR NEXT TEST) In Vitro Strip 1 strip by Finger stick route daily.  Disp: 100 strip Rfl: 1   AmLODIP Skin: Skin is warm and dry. Psychiatric: He has a normal mood and affect.  His behavior is normal.       Assessment/Plan:  CHF (congestive heart failure) ([facility] [address]  Will see if insurance will cover jardiance rx       Type 2 diabetes mellitus with hyperglyce in detail  Patient will benefit from lifestyle modifications (dietary modifications, exercise and weight loss)     Recommended for  patient to follow up in Diabetes center to review carb goals, food label reading, and offer further support/guidance with ex each          Refill:  1      DM Quality Indicators:  A1C as reported above  Retinopathy Screen: Needed. Nephropathy Screen: needed; cannot collect urine.    Continue ACEi  Depression Screen:  PHQ-2 NEG   Feet exam: Reviewed feet precautions PCP 8-5-2019

## 2019-08-07 ENCOUNTER — TELEPHONE (OUTPATIENT)
Dept: INTERNAL MEDICINE CLINIC | Facility: CLINIC | Age: 59
End: 2019-08-07

## 2019-08-07 NOTE — TELEPHONE ENCOUNTER
From: Jacob Perez  To: ADAM Hogan  Sent: 8/6/2019 8:51 PM CDT  Subject: Visit Follow-up Question    Johnny Reyes,    It was nice to meet you today. I am not sure if this is the norm but my blood glucose level is 109.  I am not sure if this

## 2019-08-07 NOTE — TELEPHONE ENCOUNTER
LOV: 8/6/19  Next OV: 8/20/19  Per note:  MEDICATIONS:   Continue Lantus 10 units   If by Wednesday your blood sugar is still above 150: increase to 12 units once daily      Start glimepiride 4mg once daily in AM with breakfast   Continue Metformin

## 2019-08-07 NOTE — TELEPHONE ENCOUNTER
PA for Freestyle 14 day sensor started via cover my meds: Key: [id number] – Rx #: [id number]. Per [facility] [id number].

## 2019-08-12 ENCOUNTER — TELEPHONE (OUTPATIENT)
Dept: INTERNAL MEDICINE CLINIC | Facility: CLINIC | Age: 59
End: 2019-08-12

## 2019-08-12 NOTE — TELEPHONE ENCOUNTER
Started PA ok per CAB key- [id number]      Your information has been submitted to [employer] is reviewing the PA request and you will receive an electronic response.  You may check for the updated outcome later by reopening this request. The st

## 2019-08-13 NOTE — TELEPHONE ENCOUNTER
Received fax \"PA request\" from [employer]. It was basically asking the same questions covered in a PA. Fax confirmation received. Filed in folder.

## 2019-08-14 NOTE — TELEPHONE ENCOUNTER
Called [employer]. They did not receive the whole document and asked us to re-send. 13 pages sent. Fax confirmation received. Paperwork saved in file.

## 2019-08-15 NOTE — TELEPHONE ENCOUNTER
Received fax from [employer]: request for Trevino sensor denied. Pt must be taking 3 or more insulin injections/day or on a pump. He also must be testing BG's QID for at least 2 months. To appeal fax letter to [phone]. The phone # [phone].

## 2019-08-20 ENCOUNTER — OFFICE VISIT (OUTPATIENT)
Dept: ENDOCRINOLOGY CLINIC | Facility: CLINIC | Age: 59
End: 2019-08-20
Payer: MEDICAID

## 2019-08-20 ENCOUNTER — TELEPHONE (OUTPATIENT)
Dept: INTERNAL MEDICINE CLINIC | Facility: CLINIC | Age: 59
End: 2019-08-20

## 2019-08-20 VITALS
SYSTOLIC BLOOD PRESSURE: 120 MMHG | WEIGHT: 185.63 LBS | RESPIRATION RATE: 16 BRPM | DIASTOLIC BLOOD PRESSURE: 80 MMHG | HEIGHT: 74 IN | HEART RATE: 76 BPM | BODY MASS INDEX: 23.82 KG/M2

## 2019-08-20 DIAGNOSIS — E11.9 TYPE 2 DIABETES MELLITUS WITHOUT COMPLICATION, WITHOUT LONG-TERM CURRENT USE OF INSULIN (HCC): ICD-10-CM

## 2019-08-20 DIAGNOSIS — E11.65 TYPE 2 DIABETES MELLITUS WITH HYPERGLYCEMIA, WITHOUT LONG-TERM CURRENT USE OF INSULIN (HCC): Primary | ICD-10-CM

## 2019-08-20 PROCEDURE — 95251 CONT GLUC MNTR ANALYSIS I&R: CPT | Performed by: NURSE PRACTITIONER

## 2019-08-20 PROCEDURE — 99214 OFFICE O/P EST MOD 30 MIN: CPT | Performed by: NURSE PRACTITIONER

## 2019-08-20 RX ORDER — GLIMEPIRIDE 2 MG/1
TABLET ORAL
Qty: 90 TABLET | COMMUNITY
Start: 2019-08-20 | End: 2020-02-03 | Stop reason: SDUPTHER

## 2019-08-20 RX ORDER — METFORMIN HYDROCHLORIDE 500 MG/1
500 TABLET, EXTENDED RELEASE ORAL 2 TIMES DAILY WITH MEALS
Qty: 180 TABLET | Refills: 0 | COMMUNITY
Start: 2019-08-20 | End: 2020-07-03

## 2019-08-20 NOTE — TELEPHONE ENCOUNTER
Called pt to remind him about the lab he has tried here in the office but is never successful pt verbalize he understands and will get lab done.

## 2019-08-20 NOTE — PROGRESS NOTES
Patient presents with:  Diabetes: Room 17-, pt has readings on phone and some on meter      HPI:   Juan Manuel Doonvan is a 64year old male presenting for type  DM medication management. In the past 2 weeks his BG trends have improved.  He had to stop the Lantus due 07/26/2019    GFRAA 68 07/26/2019       SMBG patterns: Contour  meter   Testing BG x  2 daily  Meter or log book today: no    DM associated symptoms:   Polyuria, polyphagia, polydipsia: no  Paresthesias: no  Blurred vision: no  Hypoglycemia: no    DM Compl ([facility]) Does not apply Misc Apply 1 sensor to skin every 14 days Disp: 2 each Rfl: 1   BUPROPION HCL ER, SR, 200 MG Oral Tablet 12 Hr TAKE 1 TABLET(200 MG) BY MOUTH TWICE DAILY Disp: 60 tablet Rfl: 1   cetirizine 10 MG Oral Tab Take well-developed and well-nourished. No distress. Cardiovascular: Normal rate. Edema not present. Pulmonary/Chest: Effort normal.   Neurological: He is alert and oriented to person, place, and time. Skin: Skin is warm and dry.    Psychiatric: He has total) by mouth 2 (two) times daily with meals.           Dispense:  180 tablet          Refill:  0      glimepiride 2 MG Oral Tab          Si mg daily          Dispense:  90 tablet      DM Quality Indicators:  A1C as reported above  Retinopathy Screen:

## 2019-08-20 NOTE — PATIENT INSTRUCTIONS
Your trends are much better    Ok to stay off Lantus - you do not need it anymore   Continue:    Metformin  XR 500m tabs twice daily     Decrease  glimepiride 4mg to 2mg (cut tablet in half) and still take in the morning.      Fluctuations in blood suga

## 2019-08-20 NOTE — TELEPHONE ENCOUNTER
Please call pt- he is due  for urine microalb ; lab was ordered. He can have this done at anytime. The order is in [address] so any [facility] lab will work.

## 2019-08-21 ENCOUNTER — APPOINTMENT (OUTPATIENT)
Dept: LAB | Age: 59
End: 2019-08-21
Attending: INTERNAL MEDICINE
Payer: MEDICAID

## 2019-08-21 DIAGNOSIS — E11.65 TYPE 2 DIABETES MELLITUS WITH HYPERGLYCEMIA, WITHOUT LONG-TERM CURRENT USE OF INSULIN (HCC): ICD-10-CM

## 2019-08-21 LAB
CREAT UR-SCNC: 183 MG/DL
MICROALBUMIN UR-MCNC: 3.87 MG/DL
MICROALBUMIN/CREAT 24H UR-RTO: 21.1 UG/MG (ref ?–30)

## 2019-08-21 PROCEDURE — 82570 ASSAY OF URINE CREATININE: CPT

## 2019-08-21 PROCEDURE — 82043 UR ALBUMIN QUANTITATIVE: CPT

## 2019-08-26 ENCOUNTER — OFFICE VISIT (OUTPATIENT)
Dept: INTERNAL MEDICINE CLINIC | Facility: CLINIC | Age: 59
End: 2019-08-26
Payer: MEDICAID

## 2019-08-26 VITALS
SYSTOLIC BLOOD PRESSURE: 136 MMHG | WEIGHT: 186 LBS | RESPIRATION RATE: 18 BRPM | DIASTOLIC BLOOD PRESSURE: 74 MMHG | TEMPERATURE: 98 F | HEART RATE: 68 BPM | BODY MASS INDEX: 23.87 KG/M2 | HEIGHT: 74 IN | OXYGEN SATURATION: 98 %

## 2019-08-26 DIAGNOSIS — E11.65 TYPE 2 DIABETES MELLITUS WITH HYPERGLYCEMIA, WITHOUT LONG-TERM CURRENT USE OF INSULIN (HCC): Primary | ICD-10-CM

## 2019-08-26 DIAGNOSIS — J30.2 SEASONAL ALLERGIES: ICD-10-CM

## 2019-08-26 PROCEDURE — 99213 OFFICE O/P EST LOW 20 MIN: CPT | Performed by: INTERNAL MEDICINE

## 2019-08-26 RX ORDER — CETIRIZINE HYDROCHLORIDE 10 MG/1
10 TABLET ORAL DAILY
Qty: 30 TABLET | Refills: 2 | Status: SHIPPED | OUTPATIENT
Start: 2019-08-26 | End: 2020-01-02

## 2019-08-27 NOTE — PROGRESS NOTES
HPI:    Patient ID: Jacob Perez is a 64year old male.     HPI  Here to fu on dm, htn, seasonal allergies, sugars better, didn't tolerate high dose metformin  /74   Pulse 68   Temp 98.1 °F (36.7 °C) (Oral)   Resp 18   Ht 74\"   Wt 186 lb   SpO2 9 and well-nourished. HENT:   Head: Normocephalic and atraumatic. Eyes: Pupils are equal, round, and reactive to light. Cardiovascular: Normal rate, regular rhythm and normal heart sounds. No murmur heard.   Pulmonary/Chest: Effort normal and breath s

## 2019-08-28 RX ORDER — BUPROPION HYDROCHLORIDE 200 MG/1
TABLET, EXTENDED RELEASE ORAL
Qty: 180 TABLET | Refills: 0 | Status: SHIPPED | OUTPATIENT
Start: 2019-08-28 | End: 2019-12-25

## 2019-08-28 NOTE — TELEPHONE ENCOUNTER
Last Office Visit: 8-26-19 with FABIEN for medication follow up  Last Rx Filled: 6-19-19 60 tabs with 1 refill   Last Labs: 8-21-19 urine microalbumin. 8-5-19 hga1c.   Future Appointment: 1-13-20    Per protocol to provider

## 2019-09-09 ENCOUNTER — TELEPHONE (OUTPATIENT)
Dept: ENDOCRINOLOGY CLINIC | Facility: CLINIC | Age: 59
End: 2019-09-09

## 2019-09-17 DIAGNOSIS — E11.9 TYPE 2 DIABETES MELLITUS WITHOUT COMPLICATION, WITHOUT LONG-TERM CURRENT USE OF INSULIN (HCC): ICD-10-CM

## 2019-09-17 RX ORDER — METFORMIN HYDROCHLORIDE 500 MG/1
TABLET, EXTENDED RELEASE ORAL
Qty: 120 TABLET | Refills: 0 | Status: SHIPPED | OUTPATIENT
Start: 2019-09-17 | End: 2019-12-25

## 2019-09-18 NOTE — TELEPHONE ENCOUNTER
We have not received the stool cards I gave him in aug, can you please call him and find out if he did them, if not, do asap

## 2019-09-18 NOTE — TELEPHONE ENCOUNTER
Spoke with Marni Wooten, pt states he has yet to complete FOBT card. Pt notified to complete test ASAP. FWD to SURINDER CONN. Michelle Arrington

## 2019-10-28 ENCOUNTER — TELEPHONE (OUTPATIENT)
Dept: ENDOCRINOLOGY CLINIC | Facility: CLINIC | Age: 59
End: 2019-10-28

## 2019-10-28 DIAGNOSIS — E11.65 TYPE 2 DIABETES MELLITUS WITH HYPERGLYCEMIA, WITHOUT LONG-TERM CURRENT USE OF INSULIN (HCC): Primary | ICD-10-CM

## 2019-10-28 NOTE — TELEPHONE ENCOUNTER
Called pt per CAB to let him know to get his a1c done no need to fast. Pt agree and will get lab done.

## 2019-12-24 DIAGNOSIS — E11.9 TYPE 2 DIABETES MELLITUS WITHOUT COMPLICATION, WITHOUT LONG-TERM CURRENT USE OF INSULIN (HCC): ICD-10-CM

## 2019-12-24 NOTE — TELEPHONE ENCOUNTER
Metformin protocol failed due to Last HgBA1C < 7.5    Please advise,    LOV:8/26/19 JL  FOV:1/13/20  LAST RX:  Metformin: 9/17/19 500 mg 2 tabs twice a day 120 tabs 0 refills     Buproprion: 8/28/19 200 mg 1 tab twice a day 180 tabs 0 refills     LAST LABS

## 2019-12-25 RX ORDER — METFORMIN HYDROCHLORIDE 500 MG/1
TABLET, EXTENDED RELEASE ORAL
Qty: 120 TABLET | Refills: 0 | Status: SHIPPED | OUTPATIENT
Start: 2019-12-25 | End: 2020-03-17

## 2019-12-25 RX ORDER — BUPROPION HYDROCHLORIDE 200 MG/1
TABLET, EXTENDED RELEASE ORAL
Qty: 180 TABLET | Refills: 0 | Status: SHIPPED | OUTPATIENT
Start: 2019-12-25 | End: 2020-03-06

## 2020-01-01 DIAGNOSIS — J30.2 SEASONAL ALLERGIES: ICD-10-CM

## 2020-01-02 RX ORDER — CETIRIZINE HYDROCHLORIDE 10 MG/1
10 TABLET ORAL DAILY
Qty: 90 TABLET | Refills: 1 | Status: SHIPPED | OUTPATIENT
Start: 2020-01-02

## 2020-02-03 RX ORDER — GLIMEPIRIDE 2 MG/1
TABLET ORAL
Qty: 30 TABLET | Refills: 0 | Status: SHIPPED | OUTPATIENT
Start: 2020-02-03 | End: 2020-03-06

## 2020-02-03 NOTE — TELEPHONE ENCOUNTER
Approved 30 d rx for glimepiride only     Message on rx to make appt for DM - [facility]

## 2020-03-06 ENCOUNTER — TELEPHONE (OUTPATIENT)
Dept: INTERNAL MEDICINE CLINIC | Facility: CLINIC | Age: 60
End: 2020-03-06

## 2020-03-06 RX ORDER — GLIMEPIRIDE 2 MG/1
TABLET ORAL
Qty: 30 TABLET | Refills: 0 | Status: SHIPPED | OUTPATIENT
Start: 2020-03-06 | End: 2020-04-21

## 2020-03-06 RX ORDER — BUPROPION HYDROCHLORIDE 200 MG/1
TABLET, EXTENDED RELEASE ORAL
Qty: 30 TABLET | Refills: 0 | Status: SHIPPED | OUTPATIENT
Start: 2020-03-06 | End: 2020-06-30

## 2020-03-06 NOTE — TELEPHONE ENCOUNTER
Last Ov: 8/26/19, JL, DM med f/u  Upcoming appt: no upcoming appt  Last labs: microalb/creat 8/21/19  Last Rx: bupropion ER 200mg, #180, 0R 12/25/19    Per Protocol, not on protocol. Rx pending.

## 2020-03-06 NOTE — TELEPHONE ENCOUNTER
Medication(s) to Refill:   Requested Prescriptions     Pending Prescriptions Disp Refills   • GLIMEPIRIDE 2 MG Oral Tab [Pharmacy Med Name: GLIMEPIRIDE 2MG TABLETS] 30 tablet 0     Sig: TAKE 1 TABLET BY MOUTH DAILY       Last Time Medication was Filled:  2

## 2020-03-17 DIAGNOSIS — E11.9 TYPE 2 DIABETES MELLITUS WITHOUT COMPLICATION, WITHOUT LONG-TERM CURRENT USE OF INSULIN (HCC): ICD-10-CM

## 2020-03-17 RX ORDER — METFORMIN HYDROCHLORIDE 500 MG/1
TABLET, EXTENDED RELEASE ORAL
Qty: 120 TABLET | Refills: 0 | Status: SHIPPED | OUTPATIENT
Start: 2020-03-17 | End: 2020-07-03 | Stop reason: SDUPTHER

## 2020-03-17 NOTE — TELEPHONE ENCOUNTER
Called pt to schedule f/u visit for 3/23/20 7:30am        FYI to [facility].

## 2020-03-17 NOTE — TELEPHONE ENCOUNTER
Last VISIT 08/26/19    Last REFILL 12/25/19 qty 120 w/0 refills    Last LABS 08/21/19 Microalb/creat done    Future Appointments   Date Time Provider Tonia Maldonado   3/23/2020  7:30 AM Georgia Xie MD [facility]         Per PROTOCOL?  Maryse Enciso

## 2020-04-21 RX ORDER — GLIMEPIRIDE 2 MG/1
TABLET ORAL
Qty: 30 TABLET | Refills: 0 | Status: SHIPPED | OUTPATIENT
Start: 2020-04-21 | End: 2020-06-23

## 2020-05-12 ENCOUNTER — TELEPHONE (OUTPATIENT)
Dept: ENDOCRINOLOGY CLINIC | Facility: CLINIC | Age: 60
End: 2020-05-12

## 2020-05-12 NOTE — TELEPHONE ENCOUNTER
Diabetes Provider: saw CAB in Aug and was to f/u in 2 months. LMTCB and sched phone/video visit if refills desired.

## 2020-06-05 NOTE — TELEPHONE ENCOUNTER
Numerous attempts have been made to schedule either an in-person, video or phone follow-up with CAB. Letter will be sent today that if we do not hear from the pt by 6/24, diabetes care will be returned to pcp.

## 2020-06-23 ENCOUNTER — TELEPHONE (OUTPATIENT)
Dept: INTERNAL MEDICINE CLINIC | Facility: CLINIC | Age: 60
End: 2020-06-23

## 2020-06-23 ENCOUNTER — PATIENT MESSAGE (OUTPATIENT)
Dept: ENDOCRINOLOGY CLINIC | Facility: CLINIC | Age: 60
End: 2020-06-23

## 2020-06-23 RX ORDER — GLIMEPIRIDE 2 MG/1
2 TABLET ORAL DAILY
Qty: 30 TABLET | Refills: 0 | Status: SHIPPED | OUTPATIENT
Start: 2020-06-23 | End: 2020-07-27

## 2020-06-23 RX ORDER — GLIMEPIRIDE 2 MG/1
TABLET ORAL
Qty: 30 TABLET | Refills: 0 | OUTPATIENT
Start: 2020-06-23

## 2020-06-23 NOTE — TELEPHONE ENCOUNTER
From: Kevin Clark  To: ADAM Varner  Sent: 6/23/2020 4:37 PM CDT  Subject: Prescription Question    Please approve refill

## 2020-06-23 NOTE — TELEPHONE ENCOUNTER
Future Appointments   Date Time Provider Tonia Maldonado   9/4/2020  9:00 Ankur Chisholm MD [facility]     Orders to  [facility]    Pt informed that labs need to be completed no sooner than 2 weeks prior to the appt.  Pt aware to fast-no call

## 2020-06-29 NOTE — TELEPHONE ENCOUNTER
Future Appointments   Date Time Provider [facility] Erica   7/21/2020  8:30 AM ADAM Zimmer [facility]   9/4/2020  9:00 AM Akash Wang MD [facility]

## 2020-06-30 RX ORDER — BUPROPION HYDROCHLORIDE 200 MG/1
200 TABLET, EXTENDED RELEASE ORAL 2 TIMES DAILY
Qty: 30 TABLET | Refills: 0 | Status: SHIPPED | OUTPATIENT
Start: 2020-06-30 | End: 2020-08-10

## 2020-06-30 NOTE — TELEPHONE ENCOUNTER
Last Ov: 8/26/19, JL, med F/U (DM f/u)  Last labs: Microalb/creat 8/21/19  Last Rx: bupropion ER 200mg, #30, 0R 3/6/20    Future Appointments   Date Time Provider Tonia Maldonado   7/21/2020  8:30 AM ADAM Phelan [facility] [place]   9/4

## 2020-07-03 DIAGNOSIS — E11.9 TYPE 2 DIABETES MELLITUS WITHOUT COMPLICATION, WITHOUT LONG-TERM CURRENT USE OF INSULIN (HCC): ICD-10-CM

## 2020-07-03 RX ORDER — METFORMIN HYDROCHLORIDE 500 MG/1
500 TABLET, EXTENDED RELEASE ORAL 2 TIMES DAILY WITH MEALS
Qty: 180 TABLET | Refills: 0 | Status: SHIPPED | OUTPATIENT
Start: 2020-07-03 | End: 2021-01-06

## 2020-07-03 NOTE — TELEPHONE ENCOUNTER
Medication(s) to Refill:   Requested Prescriptions     Pending Prescriptions Disp Refills   • metFORMIN HCl  MG Oral Tablet 24 Hr 180 tablet 0     Sig: Take 1 tablet (500 mg total) by mouth 2 (two) times daily with meals.            Last Time Jade Lugo

## 2020-07-21 ENCOUNTER — OFFICE VISIT (OUTPATIENT)
Dept: ENDOCRINOLOGY CLINIC | Facility: CLINIC | Age: 60
End: 2020-07-21
Payer: MEDICAID

## 2020-07-21 ENCOUNTER — APPOINTMENT (OUTPATIENT)
Dept: LAB | Age: 60
End: 2020-07-21
Attending: NURSE PRACTITIONER
Payer: MEDICAID

## 2020-07-21 VITALS
BODY MASS INDEX: 24.51 KG/M2 | OXYGEN SATURATION: 98 % | DIASTOLIC BLOOD PRESSURE: 72 MMHG | HEIGHT: 74 IN | TEMPERATURE: 97 F | WEIGHT: 191 LBS | HEART RATE: 75 BPM | SYSTOLIC BLOOD PRESSURE: 114 MMHG

## 2020-07-21 DIAGNOSIS — E11.65 TYPE 2 DIABETES MELLITUS WITH HYPERGLYCEMIA, WITHOUT LONG-TERM CURRENT USE OF INSULIN (HCC): ICD-10-CM

## 2020-07-21 DIAGNOSIS — Z00.00 PERIODIC HEALTH ASSESSMENT, GENERAL SCREENING, ADULT: ICD-10-CM

## 2020-07-21 DIAGNOSIS — E11.9 TYPE 2 DIABETES MELLITUS WITHOUT COMPLICATION, WITHOUT LONG-TERM CURRENT USE OF INSULIN (HCC): ICD-10-CM

## 2020-07-21 DIAGNOSIS — F41.9 ANXIETY: ICD-10-CM

## 2020-07-21 DIAGNOSIS — E11.65 TYPE 2 DIABETES MELLITUS WITH HYPERGLYCEMIA, WITHOUT LONG-TERM CURRENT USE OF INSULIN (HCC): Primary | ICD-10-CM

## 2020-07-21 DIAGNOSIS — S91.301A OPEN WOUND OF RIGHT HEEL, INITIAL ENCOUNTER: ICD-10-CM

## 2020-07-21 LAB
ALBUMIN SERPL-MCNC: 3.4 G/DL (ref 3.4–5)
ALBUMIN/GLOB SERPL: 0.9 {RATIO} (ref 1–2)
ALP LIVER SERPL-CCNC: 84 U/L (ref 45–117)
ALT SERPL-CCNC: 21 U/L (ref 16–61)
ANION GAP SERPL CALC-SCNC: 5 MMOL/L (ref 0–18)
AST SERPL-CCNC: 15 U/L (ref 15–37)
BILIRUB SERPL-MCNC: 1 MG/DL (ref 0.1–2)
BUN BLD-MCNC: 13 MG/DL (ref 7–18)
BUN/CREAT SERPL: 10.2 (ref 10–20)
CALCIUM BLD-MCNC: 8.4 MG/DL (ref 8.5–10.1)
CARTRIDGE LOT#: 638 NUMERIC
CHLORIDE SERPL-SCNC: 104 MMOL/L (ref 98–112)
CHOLEST SMN-MCNC: 135 MG/DL (ref ?–200)
CO2 SERPL-SCNC: 30 MMOL/L (ref 21–32)
COMPLEXED PSA SERPL-MCNC: 1.43 NG/ML (ref ?–4)
CREAT BLD-MCNC: 1.28 MG/DL (ref 0.7–1.3)
EST. AVERAGE GLUCOSE BLD GHB EST-MCNC: 192 MG/DL (ref 68–126)
GLOBULIN PLAS-MCNC: 3.6 G/DL (ref 2.8–4.4)
GLUCOSE BLD-MCNC: 166 MG/DL (ref 70–99)
GLUCOSE BLOOD: 162
HBA1C MFR BLD HPLC: 8.3 % (ref ?–5.7)
HDLC SERPL-MCNC: 43 MG/DL (ref 40–59)
HEMOGLOBIN A1C: 8.1 % (ref 4.3–5.6)
LDLC SERPL CALC-MCNC: 82 MG/DL (ref ?–100)
M PROTEIN MFR SERPL ELPH: 7 G/DL (ref 6.4–8.2)
NONHDLC SERPL-MCNC: 92 MG/DL (ref ?–130)
OSMOLALITY SERPL CALC.SUM OF ELEC: 292 MOSM/KG (ref 275–295)
PATIENT FASTING Y/N/NP: YES
POTASSIUM SERPL-SCNC: 3.6 MMOL/L (ref 3.5–5.1)
SODIUM SERPL-SCNC: 139 MMOL/L (ref 136–145)
TEST STRIP LOT #: NORMAL NUMERIC
TRIGL SERPL-MCNC: 50 MG/DL (ref 30–149)
TSI SER-ACNC: 0.96 MIU/ML (ref 0.36–3.74)
VIT D+METAB SERPL-MCNC: 14.8 NG/ML (ref 30–100)
VLDLC SERPL CALC-MCNC: 10 MG/DL (ref 0–30)

## 2020-07-21 PROCEDURE — 3074F SYST BP LT 130 MM HG: CPT | Performed by: NURSE PRACTITIONER

## 2020-07-21 PROCEDURE — 80061 LIPID PANEL: CPT

## 2020-07-21 PROCEDURE — 83036 HEMOGLOBIN GLYCOSYLATED A1C: CPT | Performed by: NURSE PRACTITIONER

## 2020-07-21 PROCEDURE — 3078F DIAST BP <80 MM HG: CPT | Performed by: NURSE PRACTITIONER

## 2020-07-21 PROCEDURE — 80053 COMPREHEN METABOLIC PANEL: CPT

## 2020-07-21 PROCEDURE — 82306 VITAMIN D 25 HYDROXY: CPT

## 2020-07-21 PROCEDURE — 83036 HEMOGLOBIN GLYCOSYLATED A1C: CPT

## 2020-07-21 PROCEDURE — 3008F BODY MASS INDEX DOCD: CPT | Performed by: NURSE PRACTITIONER

## 2020-07-21 PROCEDURE — 84443 ASSAY THYROID STIM HORMONE: CPT

## 2020-07-21 PROCEDURE — 82962 GLUCOSE BLOOD TEST: CPT | Performed by: NURSE PRACTITIONER

## 2020-07-21 PROCEDURE — 99215 OFFICE O/P EST HI 40 MIN: CPT | Performed by: NURSE PRACTITIONER

## 2020-07-21 RX ORDER — ERGOCALCIFEROL 1.25 MG/1
50000 CAPSULE ORAL WEEKLY
Qty: 12 CAPSULE | Refills: 0 | Status: SHIPPED | OUTPATIENT
Start: 2020-07-21 | End: 2020-10-07

## 2020-07-21 NOTE — PATIENT INSTRUCTIONS
Dr Dyllan Thrasher- foot doctor    [phone]  [address] [facility], [address]  Phone: [phone]      Your trends are better. 8.1% (down from 13.7%)     This medication will remove extra sugar out of blood into your urine.    It can be dosed anytime water. These low levels would not be expected to lead to an increase in the risk of cancer. However, sustained higher levels of exposure may increase the risk of cancer in humans.     FDA is alerting patients and health care professionals to five companies' specific metformin was affected, the pharmacy will contact you and substitute an alternative. I am happy to discuss this further with you if you want. Please note the above info was taken directly from FDA website.   You are welcome to review more of this

## 2020-07-21 NOTE — PROGRESS NOTES
Patient presents with:  Diabetes: room-16 cf. pt forgot meter. HPI:   Sara Gu is a 64year old male presenting for type 2 DM medication management.   Last DM appt 8-2019   In the past 3m DM control has improved to 8.1% (last A1C 13.7%)   Sent pt to lab 25OH, Total      30.0 - 100.0 ng/mL 27.7 (L)     DM Complications:  Microvascular:   Neuropathy: no  Retinopathy: no  Nephropathy: no    Macrovascular:  PVD: no  CAD: yes HX CHF, + CAD   Stroke/CVA: no    Modifying factors:  Medication adherence: yes,   Ga tablet 0   • cetirizine 10 MG Oral Tab Take 1 tablet (10 mg total) by mouth daily. 90 tablet 1   • Glucose Blood (MARIAH CONTOUR NEXT TEST) In Vitro Strip 1 strip by Finger stick route daily.  100 strip 1   • AmLODIPine Besylate 2.5 MG Oral Tab Take 1 tablet normal. Musculoskeletal:      Comments: Diabetes foot exam:   Good foot hygiene. Bilateral barefoot skin diabetic exam: R foot heel wound: 3 cm depressed area . Slight erythema. No exudate.      L foot skin  normal.  Visualized feet and the appearance : m daily   Reminded about s/s and tx hypoglycemia   Reviewed clinical signifiance of A1c, adverse effects of suboptimal glucose control, and goals of therapy discussed with pt in detail  Continue lifestyle modifications (dietary modifications, exercise and we statin rx.  Labs 7-2019 - collected today   Dentist: recommended every 6 months      VIT D DEF  Component      Latest Ref Rng & Units 7/21/2020   Vitamin D, 25OH, Total      30.0 - 100.0 ng/mL 14.8 (L)   rx VIT D: Ergocalciferol 50, 000 units once weekly fo

## 2020-07-23 ENCOUNTER — EXTERNAL RECORD (OUTPATIENT)
Dept: HEALTH INFORMATION MANAGEMENT | Facility: OTHER | Age: 60
End: 2020-07-23

## 2020-07-27 RX ORDER — GLIMEPIRIDE 2 MG/1
TABLET ORAL
Qty: 30 TABLET | Refills: 0 | Status: SHIPPED | OUTPATIENT
Start: 2020-07-27 | End: 2020-10-06

## 2020-07-27 NOTE — TELEPHONE ENCOUNTER
Medication(s) to Refill:   Requested Prescriptions     Pending Prescriptions Disp Refills   • GLIMEPIRIDE 2 MG Oral Tab [Pharmacy Med Name: GLIMEPIRIDE 2MG TABLETS] 30 tablet 0     Sig: TAKE 1 TABLET(2 MG) BY MOUTH DAILY           Last Time Medication was

## 2020-07-30 ENCOUNTER — TELEPHONE (OUTPATIENT)
Dept: ENDOCRINOLOGY CLINIC | Facility: CLINIC | Age: 60
End: 2020-07-30

## 2020-08-10 RX ORDER — BUPROPION HYDROCHLORIDE 200 MG/1
200 TABLET, EXTENDED RELEASE ORAL 2 TIMES DAILY
Qty: 30 TABLET | Refills: 0 | Status: SHIPPED | OUTPATIENT
Start: 2020-08-10 | End: 2020-08-31

## 2020-08-10 NOTE — TELEPHONE ENCOUNTER
Last VISIT 08/26/19    Last REFILL 06/30/20 qty 30 w/0 refills    Last LABS 07/21/20 Vit D, A1c done     Future Appointments                        9/4/2020  9:00 AM Jonas Farias MD [facility]         Per PROTOCOL?  Not on protocol    Please

## 2020-08-10 NOTE — TELEPHONE ENCOUNTER
Pt had Lipid, TSH, PSA, A1c, and CMP done 7/21/20. FWD to JL, and additional labs needed prior to upcoming appt?

## 2020-08-18 ENCOUNTER — OFFICE VISIT (OUTPATIENT)
Dept: ENDOCRINOLOGY CLINIC | Facility: CLINIC | Age: 60
End: 2020-08-18
Payer: MEDICAID

## 2020-08-18 VITALS
BODY MASS INDEX: 24.51 KG/M2 | WEIGHT: 191 LBS | OXYGEN SATURATION: 98 % | HEIGHT: 74 IN | HEART RATE: 60 BPM | DIASTOLIC BLOOD PRESSURE: 68 MMHG | SYSTOLIC BLOOD PRESSURE: 100 MMHG | TEMPERATURE: 98 F

## 2020-08-18 DIAGNOSIS — E11.65 TYPE 2 DIABETES MELLITUS WITH HYPERGLYCEMIA, WITHOUT LONG-TERM CURRENT USE OF INSULIN (HCC): Primary | ICD-10-CM

## 2020-08-18 LAB
CARTRIDGE LOT#: 691 NUMERIC
GLUCOSE BLOOD: 122
HEMOGLOBIN A1C: 7.4 % (ref 4.3–5.6)
TEST STRIP LOT #: NORMAL NUMERIC

## 2020-08-18 PROCEDURE — 99213 OFFICE O/P EST LOW 20 MIN: CPT | Performed by: NURSE PRACTITIONER

## 2020-08-18 PROCEDURE — 3008F BODY MASS INDEX DOCD: CPT | Performed by: NURSE PRACTITIONER

## 2020-08-18 PROCEDURE — 3074F SYST BP LT 130 MM HG: CPT | Performed by: NURSE PRACTITIONER

## 2020-08-18 PROCEDURE — 83036 HEMOGLOBIN GLYCOSYLATED A1C: CPT | Performed by: NURSE PRACTITIONER

## 2020-08-18 PROCEDURE — 3078F DIAST BP <80 MM HG: CPT | Performed by: NURSE PRACTITIONER

## 2020-08-18 PROCEDURE — 82962 GLUCOSE BLOOD TEST: CPT | Performed by: NURSE PRACTITIONER

## 2020-08-18 RX ORDER — ATORVASTATIN CALCIUM 10 MG/1
10 TABLET, FILM COATED ORAL DAILY
COMMUNITY
Start: 2020-07-24

## 2020-08-18 NOTE — PROGRESS NOTES
Patient presents with:  Diabetes: room-17 cf.  pt forgot meter. HPI:   Saad Saha is a 64year old male presenting for type 2 DM medication management. In the past 4 weeks BG trends have improved.  At last visit we started jardiance 10mg once daily   He allergies.     Past Medical History:   Diagnosis Date   • ALLERGIC RHINITIS    • Anxiety    • ASTHMA    • Congestive heart disease ([facility] [address]    • DEPRESSION    • DIABETES    • HYPERTENSION    • MRSA (methicillin resistant staph aureus) culture positive 5/2010 1   • AmLODIPine Besylate 2.5 MG Oral Tab Take 1 tablet by mouth daily. 3   • hydrALAzine HCl 50 MG Oral Tab Take 50 mg by mouth 3 (three) times daily. • aspirin 81 MG Oral Tab Take 81 mg by mouth daily.      • spironolactone (ALDACTONE) 25 MG Oral Tab Anxiety  Advised to call f/u w I navigator.    Denies HI/SI    Type 2 diabetes mellitus with hyperglycemia, without long-term current use of insulin (HCC)  A1C: 7.4% (last A1C 8.1 %)   Improved DM trends   Had lengthy discussion regarding poor glyce

## 2020-08-18 NOTE — PATIENT INSTRUCTIONS
Your trends are doing better.    I will see you back by 2021     Continue:   Metformin XR 500m tab twice daily   Glimepiride 2 mg : 1/2 tab once daily   jardiance 10mg once daily     Fluctuations in blood sugars are best detected by testing your

## 2020-08-21 ENCOUNTER — OFFICE VISIT (OUTPATIENT)
Dept: PODIATRY CLINIC | Facility: CLINIC | Age: 60
End: 2020-08-21
Payer: MEDICAID

## 2020-08-21 DIAGNOSIS — B35.1 DERMATOPHYTOSIS OF NAIL: Primary | ICD-10-CM

## 2020-08-21 DIAGNOSIS — E11.42 DIABETIC PERIPHERAL NEUROPATHY (HCC): ICD-10-CM

## 2020-08-21 DIAGNOSIS — E11.65 TYPE 2 DIABETES MELLITUS WITH HYPERGLYCEMIA, WITHOUT LONG-TERM CURRENT USE OF INSULIN (HCC): ICD-10-CM

## 2020-08-21 PROCEDURE — 99203 OFFICE O/P NEW LOW 30 MIN: CPT | Performed by: PODIATRIST

## 2020-08-24 NOTE — PROGRESS NOTES
Marcelo Mckoy is a 64year old male.  Patient presents with:  New Patient: Diabetic type 2 - did not check BS this AM - Last A1C on 8/18/20 for 7.4 - LOV w/PCP on 8/18/20 - first time seen a DPM - general diabetic check - blister on right heel - has been times daily. • aspirin 81 MG Oral Tab Take 81 mg by mouth daily. • spironolactone (ALDACTONE) 25 MG Oral Tab Take 0.5 tablets (12.5 mg total) by mouth daily.  45 tablet 0   • lisinopril (PRINIVIL,ZESTRIL) 40 MG Oral Tab Take 1 tablet (40 mg total) b Hobby Hazards: No        Sleep Concern: No        Stress Concern: No        Weight Concern: No        Special Diet: No        Back Care: No        Exercise: Yes        Bike Helmet: Yes        Seat Belt: Yes        Self-Exams: Yes    Social History Narrativ nail  -     FUNGUS DERMATOPHYTE SKIN, HAIR, NAIL CULTURE; Future    Type 2 diabetes mellitus with hyperglycemia, without long-term current use of insulin (HCC)    Diabetic peripheral neuropathy ([facility] [address]        Plan:  Today using a nail nippers were trimmed and

## 2020-08-31 RX ORDER — BUPROPION HYDROCHLORIDE 200 MG/1
TABLET, EXTENDED RELEASE ORAL
Qty: 30 TABLET | Refills: 0 | Status: SHIPPED | OUTPATIENT
Start: 2020-08-31

## 2020-08-31 NOTE — TELEPHONE ENCOUNTER
Last VISIT 08/26/19    Last REFILL 08/10/20 qty 30 w/0 refills    Last LABS 08/18/20 A1c done    Future Appointments   Date Time Provider Tonia Erica   9/4/2020  9:00 Jakub Schwartz MD [facility]         Per PROTOCOL?  Not on protocol

## 2020-09-02 ENCOUNTER — TELEPHONE (OUTPATIENT)
Dept: INTERNAL MEDICINE CLINIC | Facility: CLINIC | Age: 60
End: 2020-09-02

## 2020-09-09 RX ORDER — EMPAGLIFLOZIN 10 MG/1
TABLET, FILM COATED ORAL
Qty: 30 TABLET | Refills: 0 | Status: SHIPPED | OUTPATIENT
Start: 2020-09-09 | End: 2020-10-27

## 2020-09-11 ENCOUNTER — TELEPHONE (OUTPATIENT)
Dept: PODIATRY CLINIC | Facility: CLINIC | Age: 60
End: 2020-09-11

## 2020-09-11 DIAGNOSIS — B35.1 ONYCHOMYCOSIS: Primary | ICD-10-CM

## 2020-09-11 NOTE — TELEPHONE ENCOUNTER
----- Message from Emilee Fisher DPM sent at 9/9/2020  3:51 PM CDT -----  Results reviewed. Please inform patient that fungal culture results are positive and we should proceed with Lamisil tablet therapy.   If the patient agrees we have to do a hepat

## 2020-10-06 RX ORDER — GLIMEPIRIDE 2 MG/1
2 TABLET ORAL DAILY
Qty: 30 TABLET | Refills: 0 | Status: SHIPPED | OUTPATIENT
Start: 2020-10-06 | End: 2020-11-23

## 2020-10-27 RX ORDER — EMPAGLIFLOZIN 10 MG/1
TABLET, FILM COATED ORAL
Qty: 30 TABLET | Refills: 0 | Status: SHIPPED | OUTPATIENT
Start: 2020-10-27 | End: 2020-11-30

## 2020-11-23 RX ORDER — GLIMEPIRIDE 2 MG/1
TABLET ORAL
Qty: 30 TABLET | Refills: 0 | Status: SHIPPED | OUTPATIENT
Start: 2020-11-23 | End: 2021-02-16

## 2020-11-30 RX ORDER — EMPAGLIFLOZIN 10 MG/1
TABLET, FILM COATED ORAL
Qty: 30 TABLET | Refills: 0 | Status: SHIPPED | OUTPATIENT
Start: 2020-11-30 | End: 2020-12-01

## 2020-12-01 RX ORDER — EMPAGLIFLOZIN 10 MG/1
TABLET, FILM COATED ORAL
Qty: 30 TABLET | Refills: 0 | Status: SHIPPED | OUTPATIENT
Start: 2020-12-01 | End: 2021-02-12

## 2021-01-06 DIAGNOSIS — E11.9 TYPE 2 DIABETES MELLITUS WITHOUT COMPLICATION, WITHOUT LONG-TERM CURRENT USE OF INSULIN (HCC): ICD-10-CM

## 2021-01-06 RX ORDER — METFORMIN HYDROCHLORIDE 500 MG/1
500 TABLET, EXTENDED RELEASE ORAL 2 TIMES DAILY WITH MEALS
Qty: 180 TABLET | Refills: 0 | Status: SHIPPED | OUTPATIENT
Start: 2021-01-06 | End: 2021-06-01

## 2021-02-12 RX ORDER — EMPAGLIFLOZIN 10 MG/1
1 TABLET, FILM COATED ORAL DAILY
Qty: 30 TABLET | Refills: 0 | Status: SHIPPED | OUTPATIENT
Start: 2021-02-12 | End: 2021-03-14 | Stop reason: WASHOUT

## 2021-02-12 NOTE — TELEPHONE ENCOUNTER
LOV: 08/18/2020  Future Appointments   Date Time Provider Tonia Maldonado   2/26/2021 12:00 PM Jose R Gonzalez DPM [id number]     Sent message in [facility] for pt to call and schedule.

## 2021-02-16 ENCOUNTER — TELEPHONE (OUTPATIENT)
Dept: ENDOCRINOLOGY CLINIC | Facility: CLINIC | Age: 61
End: 2021-02-16

## 2021-02-17 RX ORDER — GLIMEPIRIDE 2 MG/1
2 TABLET ORAL DAILY
Qty: 90 TABLET | Refills: 0 | Status: SHIPPED | OUTPATIENT
Start: 2021-02-17 | End: 2021-08-23

## 2021-03-19 DIAGNOSIS — E11.65 TYPE 2 DIABETES MELLITUS WITH HYPERGLYCEMIA, WITHOUT LONG-TERM CURRENT USE OF INSULIN (HCC): Primary | ICD-10-CM

## 2021-03-19 RX ORDER — EMPAGLIFLOZIN 10 MG/1
TABLET, FILM COATED ORAL
COMMUNITY
End: 2021-03-19

## 2021-03-19 NOTE — TELEPHONE ENCOUNTER
Refill request received from [place] for Jardiance 10 mg daily   LOV 8/18/20  FOV 4/22/21  Prescription pended for signature; pt. hasn't been seen in 7 months but does have an upcoming appt. Prescription pended for signature.

## 2021-03-20 DIAGNOSIS — Z23 NEED FOR VACCINATION: ICD-10-CM

## 2021-03-22 RX ORDER — EMPAGLIFLOZIN 10 MG/1
1 TABLET, FILM COATED ORAL DAILY
Qty: 30 TABLET | Refills: 0 | Status: SHIPPED | OUTPATIENT
Start: 2021-03-22 | End: 2021-04-22 | Stop reason: ALTCHOICE

## 2021-03-25 ENCOUNTER — IMMUNIZATION (OUTPATIENT)
Dept: LAB | Age: 61
End: 2021-03-25
Attending: HOSPITALIST
Payer: MEDICAID

## 2021-03-25 DIAGNOSIS — Z23 NEED FOR VACCINATION: Primary | ICD-10-CM

## 2021-03-25 PROCEDURE — 0001A SARSCOV2 VAC 30MCG/0.3ML IM: CPT

## 2021-04-15 ENCOUNTER — IMMUNIZATION (OUTPATIENT)
Dept: LAB | Age: 61
End: 2021-04-15
Attending: HOSPITALIST
Payer: MEDICAID

## 2021-04-15 DIAGNOSIS — Z23 NEED FOR VACCINATION: Primary | ICD-10-CM

## 2021-04-15 PROCEDURE — 0002A SARSCOV2 VAC 30MCG/0.3ML IM: CPT

## 2021-04-21 NOTE — PROGRESS NOTES
Patient presents with:  Diabetes: room-17 cf. follow up. pt forgot meter. HPI:   Bob Parker is a 63year old male presenting for type 2 DM medication management.   Primary care physician: Hannah Mitchell MD  Last DM appt    In the past 3 m his DM c infections: no     Allergies: Patient has no known allergies.     Past Medical History:   Diagnosis Date   • ALLERGIC RHINITIS    • Anxiety    • ASTHMA    • Congestive heart disease ([facility] [address]    • DEPRESSION    • DIABETES    • HYPERTENSION    • MRSA (methicilli 1 tablet by mouth daily. 3   • hydrALAzine HCl 50 MG Oral Tab Take 50 mg by mouth 3 (three) times daily. • spironolactone (ALDACTONE) 25 MG Oral Tab Take 0.5 tablets (12.5 mg total) by mouth daily.  45 tablet 0   • lisinopril (PRINIVIL,ZESTRIL) 40 MG O weight: 191 lb )   Diabetes control is sub optimal    Reminded pt benefit of glycemic control for long term health implications  In past was on [place] but cost became issue- insurance will not cover   Increase Jardiance 10mg once daily in AM ---> Jardiance

## 2021-04-22 ENCOUNTER — OFFICE VISIT (OUTPATIENT)
Dept: ENDOCRINOLOGY CLINIC | Facility: CLINIC | Age: 61
End: 2021-04-22
Payer: MEDICAID

## 2021-04-22 VITALS
DIASTOLIC BLOOD PRESSURE: 80 MMHG | HEART RATE: 70 BPM | TEMPERATURE: 97 F | HEIGHT: 74 IN | BODY MASS INDEX: 25.67 KG/M2 | OXYGEN SATURATION: 98 % | WEIGHT: 200 LBS | SYSTOLIC BLOOD PRESSURE: 122 MMHG

## 2021-04-22 DIAGNOSIS — E55.9 VITAMIN D DEFICIENCY: ICD-10-CM

## 2021-04-22 DIAGNOSIS — Z12.5 SCREENING PSA (PROSTATE SPECIFIC ANTIGEN): ICD-10-CM

## 2021-04-22 DIAGNOSIS — E11.65 TYPE 2 DIABETES MELLITUS WITH HYPERGLYCEMIA, WITHOUT LONG-TERM CURRENT USE OF INSULIN (HCC): Primary | ICD-10-CM

## 2021-04-22 DIAGNOSIS — I10 ESSENTIAL HYPERTENSION: ICD-10-CM

## 2021-04-22 PROBLEM — E78.5 DYSLIPIDEMIA: Status: ACTIVE | Noted: 2021-04-22

## 2021-04-22 PROCEDURE — 82570 ASSAY OF URINE CREATININE: CPT | Performed by: NURSE PRACTITIONER

## 2021-04-22 PROCEDURE — 99214 OFFICE O/P EST MOD 30 MIN: CPT | Performed by: NURSE PRACTITIONER

## 2021-04-22 PROCEDURE — 82043 UR ALBUMIN QUANTITATIVE: CPT | Performed by: NURSE PRACTITIONER

## 2021-04-22 PROCEDURE — 83036 HEMOGLOBIN GLYCOSYLATED A1C: CPT | Performed by: NURSE PRACTITIONER

## 2021-04-22 PROCEDURE — 82947 ASSAY GLUCOSE BLOOD QUANT: CPT | Performed by: NURSE PRACTITIONER

## 2021-04-22 PROCEDURE — 3074F SYST BP LT 130 MM HG: CPT | Performed by: NURSE PRACTITIONER

## 2021-04-22 PROCEDURE — 3079F DIAST BP 80-89 MM HG: CPT | Performed by: NURSE PRACTITIONER

## 2021-04-22 PROCEDURE — 3008F BODY MASS INDEX DOCD: CPT | Performed by: NURSE PRACTITIONER

## 2021-04-22 RX ORDER — BLOOD SUGAR DIAGNOSTIC
1 STRIP MISCELLANEOUS 2 TIMES DAILY
Qty: 200 EACH | Refills: 3 | Status: SHIPPED | OUTPATIENT
Start: 2021-04-22

## 2021-04-22 RX ORDER — EMPAGLIFLOZIN 25 MG/1
1 TABLET, FILM COATED ORAL DAILY
Qty: 90 TABLET | Refills: 1 | Status: SHIPPED | OUTPATIENT
Start: 2021-04-22 | End: 2022-01-14

## 2021-04-22 NOTE — PATIENT INSTRUCTIONS
Your A1C: 7.3% (last A1C 7.4%)     The A1C test provides us with your average blood sugar for the past 3 months. Keeping an A1C less than 7% helps reduce or delay health problems that are related to diabetes.    The main goal of diabetes treatment is to [employer] irritability  · Blurred eyesight  · Having nightmares or waking up confused or sweating  · Numbness or tingling in the lips or tongue    Treatment of Low Blood sugar Action Plan  1. Check blood glucose to be sure that it is low.  You can’t always go by symp medication available.   We will gladly send a new prescription to that pharmacy at your request.     Thank you   Leta [facility]   [phone]

## 2021-04-23 ENCOUNTER — TELEPHONE (OUTPATIENT)
Dept: ENDOCRINOLOGY CLINIC | Facility: CLINIC | Age: 61
End: 2021-04-23

## 2021-04-23 NOTE — TELEPHONE ENCOUNTER
Received PA request for Jardiance 25mg. Pt was already taking jardiance 10mg. This has come up twice in last 2 days, pharmacy is using the wrong code. This is a covered med with [facility].   Ponce Leung [address]  [phone]

## 2021-05-30 DIAGNOSIS — E11.9 TYPE 2 DIABETES MELLITUS WITHOUT COMPLICATION, WITHOUT LONG-TERM CURRENT USE OF INSULIN (HCC): ICD-10-CM

## 2021-06-01 RX ORDER — METFORMIN HYDROCHLORIDE 500 MG/1
TABLET, EXTENDED RELEASE ORAL
Qty: 180 TABLET | Refills: 0 | Status: SHIPPED | OUTPATIENT
Start: 2021-06-01 | End: 2021-09-16

## 2021-06-01 NOTE — TELEPHONE ENCOUNTER
Requested Prescriptions     Pending Prescriptions Disp Refills   • METFORMIN HCL  MG Oral Tablet 24 Hr [Pharmacy Med Name: METFORMIN ER 500MG 24HR TABS] 180 tablet 0     Sig: TAKE 1 TABLET(500 MG) BY MOUTH TWICE DAILY WITH MEALS     FILLED- 01/06/21

## 2021-08-20 NOTE — TELEPHONE ENCOUNTER
Left message for patient to call office (verify dose of glimepiride). Per last office visit, patient taking 2mg 1/2 tablet daily.

## 2021-08-23 RX ORDER — GLIMEPIRIDE 2 MG/1
TABLET ORAL
Qty: 90 TABLET | Refills: 0 | Status: SHIPPED | OUTPATIENT
Start: 2021-08-23

## 2021-09-09 DIAGNOSIS — E11.9 TYPE 2 DIABETES MELLITUS WITHOUT COMPLICATION, WITHOUT LONG-TERM CURRENT USE OF INSULIN (HCC): ICD-10-CM

## 2021-09-16 ENCOUNTER — TELEPHONE (OUTPATIENT)
Dept: ENDOCRINOLOGY CLINIC | Facility: CLINIC | Age: 61
End: 2021-09-16

## 2021-09-16 RX ORDER — METFORMIN HYDROCHLORIDE 500 MG/1
TABLET, EXTENDED RELEASE ORAL
Qty: 180 TABLET | Refills: 0 | Status: SHIPPED | OUTPATIENT
Start: 2021-09-16

## 2021-11-03 RX ORDER — EMPAGLIFLOZIN 25 MG/1
1 TABLET, FILM COATED ORAL DAILY
Qty: 30 TABLET | Refills: 0 | OUTPATIENT
Start: 2021-11-03

## 2021-11-03 NOTE — TELEPHONE ENCOUNTER
Requested Prescriptions     Pending Prescriptions Disp Refills   • JARDIANCE 25 MG Oral Tab [Pharmacy Med Name: Karina Pate 25MG TABLETS] 90 tablet 1     Sig: TAKE 1 TABLET BY MOUTH DAILY     FILLED- 4/22/21  LOV- 4/22/21  FOV- my chart messages have been se

## 2021-11-03 NOTE — TELEPHONE ENCOUNTER
Message sent for appt reminded  ; still has not scheduled a diabetes followup  Also has not done annual HM labs

## 2022-01-24 ENCOUNTER — TELEPHONE (OUTPATIENT)
Dept: ENDOCRINOLOGY CLINIC | Facility: CLINIC | Age: 62
End: 2022-01-24

## 2022-01-24 DIAGNOSIS — E11.9 TYPE 2 DIABETES MELLITUS WITHOUT COMPLICATION, WITHOUT LONG-TERM CURRENT USE OF INSULIN (HCC): Primary | ICD-10-CM

## 2022-01-24 NOTE — TELEPHONE ENCOUNTER
Called pt to schedule visit pt would like a virtual visit let him know to test his BG for two weeks straight and that I will call him a day prior to appointment to collect that off him and for labs needing to be done 2-4 days from appointment.  Pt understoo

## 2022-01-24 NOTE — TELEPHONE ENCOUNTER
Requested Prescriptions     Pending Prescriptions Disp Refills   • Empagliflozin (JARDIANCE) 25 MG Oral Tab 90 tablet 1     Sig: Take 1 tablet by mouth daily.      LOV 4/22/21  FOV 2/8/22 virtual   Refill 4/22/21  a1c  7.3% 4/22/21

## 2022-01-25 RX ORDER — EMPAGLIFLOZIN 25 MG/1
1 TABLET, FILM COATED ORAL DAILY
Qty: 90 TABLET | Refills: 1 | Status: SHIPPED | OUTPATIENT
Start: 2022-01-25 | End: 2022-02-24

## 2022-03-11 ENCOUNTER — TELEPHONE (OUTPATIENT)
Dept: INTERNAL MEDICINE CLINIC | Facility: CLINIC | Age: 62
End: 2022-03-11

## 2022-03-11 NOTE — TELEPHONE ENCOUNTER
Future Appointments   Date Time Provider Tonia Maldonado   7/1/2022  9:00 AM Kelli Byrd MD [facility]     Orders to edward- Pt informed that labs need to be completed no sooner than 2 weeks prior to the appt.  Pt aware to fast-no call back required

## 2022-03-11 NOTE — TELEPHONE ENCOUNTER
Patient is due for colonoscopy. Pended colonoscopy order and FIT. Last Office Visit: 8--JL    Last Physical: 8--JL-Physical    Care Caps:  OVERDUE FOR PHYSICAL/FOLLOW UP  Diabetic Eye Exam  Shingles Vaccinex  Colonoscopy  ACT  AAP  A1C  Flu Shot      PSR: Please call patient and schedule for office visit. No future appointments.

## 2022-04-06 ENCOUNTER — TELEPHONE (OUTPATIENT)
Dept: INTERNAL MEDICINE CLINIC | Facility: CLINIC | Age: 62
End: 2022-04-06

## 2022-04-06 RX ORDER — GLIMEPIRIDE 2 MG/1
TABLET ORAL
Qty: 15 TABLET | Refills: 0 | Status: SHIPPED | OUTPATIENT
Start: 2022-04-06

## 2022-04-06 NOTE — TELEPHONE ENCOUNTER
Pt last seen 9/19, over 2 YEARS overdue for pe, seen by diabetic services without fu in over 2 years by pcp, overdue for colon ca screening, see care gap list, lmovm to return call

## 2022-04-06 NOTE — TELEPHONE ENCOUNTER
lvmtcb   Pt was asked to return to see CAB in 4-6 month back in 4/22/21.    Does have PCP FOV 7/1/22

## 2022-04-08 NOTE — TELEPHONE ENCOUNTER
Phone keeps going to , he is about 2 years overdue for pcp fu and has multiple care gaps, I need to chat with pt

## 2022-04-11 NOTE — TELEPHONE ENCOUNTER
Future Appointments   Date Time Provider Tonia Erica   7/1/2022  9:00 AM Dominick Martinez MD [facility]     LOV 8/19-not new patient until 8/22-still be seen by Nicola Alba?

## 2022-05-04 RX ORDER — GLIMEPIRIDE 2 MG/1
TABLET ORAL
Qty: 15 TABLET | Refills: 0 | OUTPATIENT
Start: 2022-05-04

## 2022-05-24 NOTE — TELEPHONE ENCOUNTER
Pt last seen 8/25/2019 which is within 3 years to be considered new patient. JL is this right? I know it's been some time since he has seen you. We can call to explain time restraints and decreased schedule and availability and offer with another provider for more consistent care for health concerns although he is not \"new patient\" and I don't know that we need to reschedule this visit to someone else if he does not want to. Can you help provide direction on this?

## 2022-05-25 NOTE — TELEPHONE ENCOUNTER
Agree not new pt just has not even come close with expected fu etc, pt with dm was to fu shortly after last ov with me and follows up with me after almost 3 YEARS, I have also asked for reach outs multiple times

## 2022-06-06 NOTE — TELEPHONE ENCOUNTER
Attempted to contact pt to reschedule with new PCP per JL but vm full. Sending [facility] message for pt to establish with partner.

## 2022-06-14 DIAGNOSIS — E11.9 TYPE 2 DIABETES MELLITUS WITHOUT COMPLICATION, WITHOUT LONG-TERM CURRENT USE OF INSULIN (HCC): ICD-10-CM

## 2022-06-15 RX ORDER — METFORMIN HYDROCHLORIDE 500 MG/1
TABLET, EXTENDED RELEASE ORAL
Qty: 180 TABLET | Refills: 0 | OUTPATIENT
Start: 2022-06-15

## 2022-06-15 NOTE — TELEPHONE ENCOUNTER
Granted 30 d rx in April 2022 and advised DM appt needed. Has not yet scheduled any follow ups   Denied rx.      Last CMP 7-2020

## 2022-09-29 DIAGNOSIS — E11.9 TYPE 2 DIABETES MELLITUS WITHOUT COMPLICATION, WITHOUT LONG-TERM CURRENT USE OF INSULIN (HCC): ICD-10-CM

## 2022-09-29 RX ORDER — GLIMEPIRIDE 2 MG/1
TABLET ORAL
Qty: 15 TABLET | Refills: 0 | OUTPATIENT
Start: 2022-09-29

## 2022-09-29 RX ORDER — EMPAGLIFLOZIN 25 MG/1
1 TABLET, FILM COATED ORAL DAILY
Qty: 90 TABLET | Refills: 1 | OUTPATIENT
Start: 2022-09-29 | End: 2022-10-29

## 2022-09-29 RX ORDER — METFORMIN HYDROCHLORIDE 500 MG/1
500 TABLET, EXTENDED RELEASE ORAL 2 TIMES DAILY WITH MEALS
Qty: 180 TABLET | Refills: 0 | OUTPATIENT
Start: 2022-09-29

## 2022-12-16 ENCOUNTER — TELEPHONE (OUTPATIENT)
Dept: CARDIOLOGY | Age: 62
End: 2022-12-16

## 2022-12-16 DIAGNOSIS — E11.9 TYPE 2 DIABETES MELLITUS WITHOUT COMPLICATION, WITHOUT LONG-TERM CURRENT USE OF INSULIN (HCC): ICD-10-CM

## 2022-12-16 NOTE — TELEPHONE ENCOUNTER
Pt has not been seen in over a year, meds should be deferred to PCP but will let CB decide when she returns next week

## 2022-12-17 RX ORDER — METFORMIN HYDROCHLORIDE 500 MG/1
500 TABLET, EXTENDED RELEASE ORAL 2 TIMES DAILY WITH MEALS
Qty: 180 TABLET | Refills: 0 | OUTPATIENT
Start: 2022-12-17

## 2022-12-17 RX ORDER — GLIMEPIRIDE 2 MG/1
TABLET ORAL
Qty: 15 TABLET | Refills: 0 | OUTPATIENT
Start: 2022-12-17

## 2022-12-17 RX ORDER — EMPAGLIFLOZIN 25 MG/1
1 TABLET, FILM COATED ORAL DAILY
Qty: 90 TABLET | Refills: 1 | OUTPATIENT
Start: 2022-12-17 | End: 2023-01-16

## 2022-12-17 NOTE — TELEPHONE ENCOUNTER
Last DM appt 3-2021   Last labs 7-2020 and has active orders from 1-2022       cx 3-2022 aptt w DM   cx 7-2022 appt w PCP

## 2022-12-17 NOTE — TELEPHONE ENCOUNTER
Last CMP on file was 7-2020  Needs labs before refill, especially for Metformin     He has active orders for labs from 1-  Has 1/2023 appt but will need labs before rx approved since last DM appt 3-2021

## 2022-12-19 ENCOUNTER — PATIENT MESSAGE (OUTPATIENT)
Dept: ENDOCRINOLOGY CLINIC | Facility: CLINIC | Age: 62
End: 2022-12-19

## 2022-12-20 ENCOUNTER — TELEPHONE (OUTPATIENT)
Dept: CARDIOLOGY | Age: 62
End: 2022-12-20

## 2022-12-20 ENCOUNTER — PATIENT MESSAGE (OUTPATIENT)
Dept: ENDOCRINOLOGY CLINIC | Facility: CLINIC | Age: 62
End: 2022-12-20

## 2022-12-20 ENCOUNTER — LAB ENCOUNTER (OUTPATIENT)
Dept: LAB | Age: 62
End: 2022-12-20
Attending: NURSE PRACTITIONER
Payer: MEDICAID

## 2022-12-20 DIAGNOSIS — E11.9 TYPE 2 DIABETES MELLITUS WITHOUT COMPLICATION, WITHOUT LONG-TERM CURRENT USE OF INSULIN (HCC): ICD-10-CM

## 2022-12-20 LAB
ALBUMIN SERPL-MCNC: 3.6 G/DL (ref 3.4–5)
ALBUMIN/GLOB SERPL: 0.9 {RATIO} (ref 1–2)
ALP LIVER SERPL-CCNC: 113 U/L
ALT SERPL-CCNC: 20 U/L
ANION GAP SERPL CALC-SCNC: 1 MMOL/L (ref 0–18)
AST SERPL-CCNC: 18 U/L (ref 15–37)
BILIRUB SERPL-MCNC: 1.3 MG/DL (ref 0.1–2)
BUN BLD-MCNC: 13 MG/DL (ref 7–18)
CALCIUM BLD-MCNC: 9.1 MG/DL (ref 8.5–10.1)
CHLORIDE SERPL-SCNC: 100 MMOL/L (ref 98–112)
CHOLEST SERPL-MCNC: 144 MG/DL (ref ?–200)
CO2 SERPL-SCNC: 33 MMOL/L (ref 21–32)
CREAT BLD-MCNC: 1.03 MG/DL
CREAT UR-SCNC: 56.6 MG/DL
EST. AVERAGE GLUCOSE BLD GHB EST-MCNC: 346 MG/DL (ref 68–126)
FASTING PATIENT LIPID ANSWER: YES
FASTING STATUS PATIENT QL REPORTED: YES
GFR SERPLBLD BASED ON 1.73 SQ M-ARVRAT: 83 ML/MIN/1.73M2 (ref 60–?)
GLOBULIN PLAS-MCNC: 3.9 G/DL (ref 2.8–4.4)
GLUCOSE BLD-MCNC: 341 MG/DL (ref 70–99)
HBA1C MFR BLD: 13.7 % (ref ?–5.7)
HDLC SERPL-MCNC: 44 MG/DL (ref 40–59)
LDLC SERPL CALC-MCNC: 85 MG/DL (ref ?–100)
MICROALBUMIN UR-MCNC: 77.9 MG/DL
MICROALBUMIN/CREAT 24H UR-RTO: 1376.3 UG/MG (ref ?–30)
NONHDLC SERPL-MCNC: 100 MG/DL (ref ?–130)
OSMOLALITY SERPL CALC.SUM OF ELEC: 292 MOSM/KG (ref 275–295)
POTASSIUM SERPL-SCNC: 4.1 MMOL/L (ref 3.5–5.1)
PROT SERPL-MCNC: 7.5 G/DL (ref 6.4–8.2)
SODIUM SERPL-SCNC: 134 MMOL/L (ref 136–145)
TRIGL SERPL-MCNC: 76 MG/DL (ref 30–149)
VLDLC SERPL CALC-MCNC: 12 MG/DL (ref 0–30)

## 2022-12-20 PROCEDURE — 80053 COMPREHEN METABOLIC PANEL: CPT | Performed by: NURSE PRACTITIONER

## 2022-12-20 PROCEDURE — 3060F POS MICROALBUMINURIA REV: CPT | Performed by: INTERNAL MEDICINE

## 2022-12-20 PROCEDURE — 80061 LIPID PANEL: CPT | Performed by: NURSE PRACTITIONER

## 2022-12-20 PROCEDURE — 83036 HEMOGLOBIN GLYCOSYLATED A1C: CPT | Performed by: NURSE PRACTITIONER

## 2022-12-20 PROCEDURE — 82043 UR ALBUMIN QUANTITATIVE: CPT | Performed by: NURSE PRACTITIONER

## 2022-12-20 PROCEDURE — 3046F HEMOGLOBIN A1C LEVEL >9.0%: CPT | Performed by: INTERNAL MEDICINE

## 2022-12-20 PROCEDURE — 82570 ASSAY OF URINE CREATININE: CPT | Performed by: NURSE PRACTITIONER

## 2022-12-20 PROCEDURE — 36415 COLL VENOUS BLD VENIPUNCTURE: CPT | Performed by: NURSE PRACTITIONER

## 2022-12-20 RX ORDER — METFORMIN HYDROCHLORIDE 500 MG/1
500 TABLET, EXTENDED RELEASE ORAL 2 TIMES DAILY WITH MEALS
Qty: 60 TABLET | Refills: 0 | Status: SHIPPED | OUTPATIENT
Start: 2022-12-20

## 2022-12-20 RX ORDER — GLIMEPIRIDE 2 MG/1
TABLET ORAL
Qty: 30 TABLET | Refills: 0 | Status: SHIPPED | OUTPATIENT
Start: 2022-12-20

## 2022-12-20 NOTE — TELEPHONE ENCOUNTER
Future Appointments   Date Time Provider Tonia Maldonado   12/22/2022  8:00 AM ADAM Hubbard [facility]   1/23/2023  7:30 AM Miguel Gamboa APRN [facility]

## 2022-12-20 NOTE — TELEPHONE ENCOUNTER
Pt will be seen 12-22- 2022 at 8 am   Will refill Metformin and glimepiride meds   Will hold off on jardiance given his hyperglycemia  As trends improve below 225, can restart jardiance  Will discuss at [facility] appt

## 2022-12-22 ENCOUNTER — TELEMEDICINE (OUTPATIENT)
Dept: ENDOCRINOLOGY CLINIC | Facility: CLINIC | Age: 62
End: 2022-12-22
Payer: MEDICAID

## 2022-12-22 ENCOUNTER — TELEPHONE (OUTPATIENT)
Dept: ENDOCRINOLOGY CLINIC | Facility: CLINIC | Age: 62
End: 2022-12-22

## 2022-12-22 DIAGNOSIS — E78.5 DYSLIPIDEMIA: ICD-10-CM

## 2022-12-22 DIAGNOSIS — E11.65 TYPE 2 DIABETES MELLITUS WITH HYPERGLYCEMIA, WITHOUT LONG-TERM CURRENT USE OF INSULIN (HCC): ICD-10-CM

## 2022-12-22 DIAGNOSIS — I10 ESSENTIAL HYPERTENSION, BENIGN: ICD-10-CM

## 2022-12-22 DIAGNOSIS — I10 ESSENTIAL HYPERTENSION: Primary | ICD-10-CM

## 2022-12-22 DIAGNOSIS — I50.9 CONGESTIVE HEART FAILURE, UNSPECIFIED HF CHRONICITY, UNSPECIFIED HEART FAILURE TYPE (HCC): ICD-10-CM

## 2022-12-22 PROCEDURE — 99215 OFFICE O/P EST HI 40 MIN: CPT | Performed by: NURSE PRACTITIONER

## 2022-12-22 RX ORDER — INSULIN GLARGINE 100 [IU]/ML
INJECTION, SOLUTION SUBCUTANEOUS
Qty: 15 ML | Refills: 0 | Status: SHIPPED | OUTPATIENT
Start: 2022-12-22

## 2022-12-22 RX ORDER — INSULIN LISPRO 100 [IU]/ML
INJECTION, SOLUTION INTRAVENOUS; SUBCUTANEOUS
Qty: 15 ML | Refills: 0 | Status: SHIPPED | OUTPATIENT
Start: 2022-12-22

## 2022-12-22 RX ORDER — ATORVASTATIN CALCIUM 10 MG/1
10 TABLET, FILM COATED ORAL NIGHTLY
Qty: 90 TABLET | Refills: 0 | Status: SHIPPED | OUTPATIENT
Start: 2022-12-22

## 2022-12-22 RX ORDER — AMLODIPINE BESYLATE 2.5 MG/1
2.5 TABLET ORAL DAILY
Qty: 30 TABLET | Refills: 0 | Status: SHIPPED | OUTPATIENT
Start: 2022-12-22

## 2022-12-22 RX ORDER — BLOOD SUGAR DIAGNOSTIC
STRIP MISCELLANEOUS
Qty: 150 STRIP | Refills: 1 | Status: SHIPPED | OUTPATIENT
Start: 2022-12-22 | End: 2023-12-22

## 2022-12-22 RX ORDER — LANCETS 33 GAUGE
1 EACH MISCELLANEOUS 4 TIMES DAILY
Qty: 150 EACH | Refills: 1 | Status: SHIPPED | OUTPATIENT
Start: 2022-12-22 | End: 2023-12-22

## 2022-12-22 RX ORDER — BLOOD-GLUCOSE METER
KIT MISCELLANEOUS
Qty: 150 EACH | Refills: 1 | Status: SHIPPED | OUTPATIENT
Start: 2022-12-22

## 2022-12-22 RX ORDER — LISINOPRIL 40 MG/1
40 TABLET ORAL
Qty: 30 TABLET | Refills: 0 | Status: SHIPPED | OUTPATIENT
Start: 2022-12-22

## 2022-12-22 RX ORDER — PERPHENAZINE 16 MG/1
TABLET, FILM COATED ORAL
Qty: 100 EACH | Refills: 3 | Status: SHIPPED | OUTPATIENT
Start: 2022-12-22 | End: 2022-12-22

## 2022-12-22 RX ORDER — PEN NEEDLE, DIABETIC 32GX 5/32"
NEEDLE, DISPOSABLE MISCELLANEOUS
Qty: 150 EACH | Refills: 0 | Status: SHIPPED | OUTPATIENT
Start: 2022-12-22 | End: 2022-12-22

## 2022-12-22 RX ORDER — CARVEDILOL 25 MG/1
TABLET ORAL
Qty: 60 TABLET | Refills: 0 | Status: SHIPPED | OUTPATIENT
Start: 2022-12-22

## 2022-12-22 RX ORDER — BUPROPION HYDROCHLORIDE 200 MG/1
200 TABLET, EXTENDED RELEASE ORAL 2 TIMES DAILY
Qty: 30 TABLET | Refills: 0 | Status: SHIPPED | OUTPATIENT
Start: 2022-12-22

## 2022-12-22 RX ORDER — BLOOD-GLUCOSE METER
1 EACH MISCELLANEOUS AS DIRECTED
Qty: 1 KIT | Refills: 0 | COMMUNITY
Start: 2022-12-22 | End: 2023-12-22

## 2022-12-22 RX ORDER — BUPROPION HYDROCHLORIDE 200 MG/1
200 TABLET, EXTENDED RELEASE ORAL 2 TIMES DAILY
Qty: 30 TABLET | Refills: 0 | Status: SHIPPED | OUTPATIENT
Start: 2022-12-22 | End: 2022-12-22

## 2022-12-22 NOTE — TELEPHONE ENCOUNTER
Received PA request from [facility] on pen needles and testing supplies. Contacted pharmacy, asked to switch to whatever pen needles are preferred by insurance. [employer]plus and onetouch sent  To pharmacy. Left message for pt regarding change.

## 2022-12-29 ENCOUNTER — TELEMEDICINE (OUTPATIENT)
Dept: ENDOCRINOLOGY CLINIC | Facility: CLINIC | Age: 62
End: 2022-12-29
Payer: MEDICAID

## 2022-12-29 DIAGNOSIS — I10 ESSENTIAL HYPERTENSION, BENIGN: ICD-10-CM

## 2022-12-29 DIAGNOSIS — E78.5 DYSLIPIDEMIA: ICD-10-CM

## 2022-12-29 DIAGNOSIS — E11.65 TYPE 2 DIABETES MELLITUS WITH HYPERGLYCEMIA, WITH LONG-TERM CURRENT USE OF INSULIN (HCC): Primary | ICD-10-CM

## 2022-12-29 DIAGNOSIS — Z79.4 TYPE 2 DIABETES MELLITUS WITH HYPERGLYCEMIA, WITH LONG-TERM CURRENT USE OF INSULIN (HCC): Primary | ICD-10-CM

## 2022-12-29 PROCEDURE — 99214 OFFICE O/P EST MOD 30 MIN: CPT | Performed by: NURSE PRACTITIONER

## 2022-12-29 RX ORDER — EMPAGLIFLOZIN 25 MG/1
1 TABLET, FILM COATED ORAL DAILY
Qty: 90 TABLET | Refills: 1 | OUTPATIENT
Start: 2022-12-29 | End: 2023-01-28

## 2022-12-29 NOTE — PATIENT INSTRUCTIONS
Patient called back with questions on the diagnosis for the need to take an iron supplement    I informed him that his iron level came back low and this is to Restart jardiance 25mg once daily   Decrease Glimepiride 2m/2  tab  once daily WITH FIRST MEAL OF DAY     Continue;:      Lantus solostar :  10 units once daily   Metformin  XR 500m tab twice daily     Humalog kwik pen before meals : 3 x daily   If blood sugar is:   200-250, 3 units  251-300, 4 units   301-350, 5 units   Over 351, 6 units

## 2022-12-30 ENCOUNTER — OFFICE VISIT (OUTPATIENT)
Dept: INTERNAL MEDICINE CLINIC | Facility: CLINIC | Age: 62
End: 2022-12-30
Payer: MEDICAID

## 2022-12-30 VITALS
TEMPERATURE: 97 F | BODY MASS INDEX: 22.01 KG/M2 | HEART RATE: 82 BPM | HEIGHT: 75 IN | WEIGHT: 177 LBS | SYSTOLIC BLOOD PRESSURE: 114 MMHG | DIASTOLIC BLOOD PRESSURE: 72 MMHG

## 2022-12-30 DIAGNOSIS — I42.0 DILATED CARDIOMYOPATHY (HCC): ICD-10-CM

## 2022-12-30 DIAGNOSIS — I25.10 CORONARY ARTERY DISEASE INVOLVING NATIVE HEART WITHOUT ANGINA PECTORIS, UNSPECIFIED VESSEL OR LESION TYPE: ICD-10-CM

## 2022-12-30 DIAGNOSIS — E78.00 PURE HYPERCHOLESTEROLEMIA: ICD-10-CM

## 2022-12-30 DIAGNOSIS — G51.0 BELL'S PALSY: ICD-10-CM

## 2022-12-30 DIAGNOSIS — I10 PRIMARY HYPERTENSION: ICD-10-CM

## 2022-12-30 DIAGNOSIS — E11.9 TYPE 2 DIABETES MELLITUS WITHOUT COMPLICATION, WITHOUT LONG-TERM CURRENT USE OF INSULIN (HCC): ICD-10-CM

## 2022-12-30 DIAGNOSIS — F33.1 MODERATE EPISODE OF RECURRENT MAJOR DEPRESSIVE DISORDER (HCC): ICD-10-CM

## 2022-12-30 DIAGNOSIS — L98.9 SKIN LESION: ICD-10-CM

## 2022-12-30 DIAGNOSIS — G25.0 ESSENTIAL TREMOR: ICD-10-CM

## 2022-12-30 DIAGNOSIS — I48.91 ATRIAL FIBRILLATION, UNSPECIFIED TYPE (HCC): Primary | ICD-10-CM

## 2022-12-30 PROBLEM — F33.9 MAJOR DEPRESSIVE DISORDER, RECURRENT, UNSPECIFIED (HCC): Status: ACTIVE | Noted: 2022-12-30

## 2022-12-30 PROCEDURE — 3078F DIAST BP <80 MM HG: CPT | Performed by: INTERNAL MEDICINE

## 2022-12-30 PROCEDURE — 3008F BODY MASS INDEX DOCD: CPT | Performed by: INTERNAL MEDICINE

## 2022-12-30 PROCEDURE — 3074F SYST BP LT 130 MM HG: CPT | Performed by: INTERNAL MEDICINE

## 2022-12-30 PROCEDURE — 99204 OFFICE O/P NEW MOD 45 MIN: CPT | Performed by: INTERNAL MEDICINE

## 2022-12-30 RX ORDER — BUPROPION HYDROCHLORIDE 200 MG/1
200 TABLET, EXTENDED RELEASE ORAL 2 TIMES DAILY
Qty: 60 TABLET | Refills: 1 | Status: SHIPPED | OUTPATIENT
Start: 2022-12-30

## 2022-12-30 RX ORDER — CARVEDILOL 25 MG/1
TABLET ORAL
Qty: 60 TABLET | Refills: 1 | Status: SHIPPED | OUTPATIENT
Start: 2022-12-30

## 2022-12-30 RX ORDER — LISINOPRIL 40 MG/1
40 TABLET ORAL
Qty: 90 TABLET | Refills: 1 | Status: SHIPPED | OUTPATIENT
Start: 2022-12-30

## 2022-12-30 RX ORDER — ATORVASTATIN CALCIUM 10 MG/1
10 TABLET, FILM COATED ORAL NIGHTLY
Qty: 90 TABLET | Refills: 1 | Status: SHIPPED | OUTPATIENT
Start: 2022-12-30

## 2023-01-01 ENCOUNTER — EXTERNAL RECORD (OUTPATIENT)
Dept: OTHER | Age: 63
End: 2023-01-01

## 2023-01-11 ENCOUNTER — OFFICE VISIT (OUTPATIENT)
Dept: INTERNAL MEDICINE CLINIC | Facility: CLINIC | Age: 63
End: 2023-01-11
Payer: MEDICAID

## 2023-01-11 ENCOUNTER — TELEPHONE (OUTPATIENT)
Dept: INTERNAL MEDICINE CLINIC | Facility: CLINIC | Age: 63
End: 2023-01-11

## 2023-01-11 ENCOUNTER — TELEPHONE (OUTPATIENT)
Dept: ENDOCRINOLOGY CLINIC | Facility: CLINIC | Age: 63
End: 2023-01-11

## 2023-01-11 VITALS
HEART RATE: 86 BPM | DIASTOLIC BLOOD PRESSURE: 82 MMHG | WEIGHT: 175 LBS | TEMPERATURE: 97 F | BODY MASS INDEX: 21.76 KG/M2 | SYSTOLIC BLOOD PRESSURE: 162 MMHG | HEIGHT: 75 IN

## 2023-01-11 DIAGNOSIS — H93.13 TINNITUS OF BOTH EARS: Primary | ICD-10-CM

## 2023-01-11 DIAGNOSIS — Z13.29 SCREENING FOR THYROID DISORDER: ICD-10-CM

## 2023-01-11 DIAGNOSIS — I50.20 HEART FAILURE WITH REDUCED EJECTION FRACTION (HCC): ICD-10-CM

## 2023-01-11 DIAGNOSIS — Z00.00 ROUTINE GENERAL MEDICAL EXAMINATION AT A HEALTH CARE FACILITY: ICD-10-CM

## 2023-01-11 DIAGNOSIS — I10 PRIMARY HYPERTENSION: ICD-10-CM

## 2023-01-11 DIAGNOSIS — F41.9 ANXIETY: ICD-10-CM

## 2023-01-11 DIAGNOSIS — Z13.220 SCREENING FOR LIPID DISORDERS: ICD-10-CM

## 2023-01-11 DIAGNOSIS — Z13.0 SCREENING FOR DISORDER OF BLOOD AND BLOOD-FORMING ORGANS: ICD-10-CM

## 2023-01-11 DIAGNOSIS — Z12.5 SCREENING FOR MALIGNANT NEOPLASM OF PROSTATE: ICD-10-CM

## 2023-01-11 DIAGNOSIS — Z13.228 SCREENING FOR METABOLIC DISORDER: ICD-10-CM

## 2023-01-11 DIAGNOSIS — E11.9 TYPE 2 DIABETES MELLITUS WITHOUT COMPLICATION, WITHOUT LONG-TERM CURRENT USE OF INSULIN (HCC): Primary | ICD-10-CM

## 2023-01-11 PROCEDURE — 99214 OFFICE O/P EST MOD 30 MIN: CPT | Performed by: INTERNAL MEDICINE

## 2023-01-11 PROCEDURE — 3008F BODY MASS INDEX DOCD: CPT | Performed by: INTERNAL MEDICINE

## 2023-01-11 PROCEDURE — 3077F SYST BP >= 140 MM HG: CPT | Performed by: INTERNAL MEDICINE

## 2023-01-11 PROCEDURE — 3079F DIAST BP 80-89 MM HG: CPT | Performed by: INTERNAL MEDICINE

## 2023-01-11 RX ORDER — BLOOD-GLUCOSE METER
1 EACH MISCELLANEOUS AS DIRECTED
Qty: 1 KIT | Refills: 1 | Status: SHIPPED | OUTPATIENT
Start: 2023-01-11

## 2023-01-11 RX ORDER — LISINOPRIL AND HYDROCHLOROTHIAZIDE 12.5; 1 MG/1; MG/1
1 TABLET ORAL DAILY
Qty: 30 TABLET | Refills: 0 | Status: SHIPPED | OUTPATIENT
Start: 2023-01-11

## 2023-01-11 NOTE — TELEPHONE ENCOUNTER
Pt was seeing MD in 28, stopped and front and states he was given contour test strips / lancets, but does not have a meter. Will send to pharmacy. Contacted pharmacy, confirmed which strips were picked up.

## 2023-01-11 NOTE — TELEPHONE ENCOUNTER
Future Appointments   Date Time Provider Tonia Maldonado   4/25/2023  8:00 AM Agustín Ulrich,  [facility]     Informed must fast no call back required.  Orders to [facility]

## 2023-01-20 PROBLEM — Z86.59 HISTORY OF DEPRESSION: Status: ACTIVE | Noted: 2023-01-20

## 2023-01-20 PROBLEM — E11.9 NON-INSULIN DEPENDENT TYPE 2 DIABETES MELLITUS (CMD): Status: ACTIVE | Noted: 2023-01-20

## 2023-01-20 PROBLEM — I10 HYPERTENSION: Status: ACTIVE | Noted: 2023-01-20

## 2023-01-20 PROBLEM — E78.5 DYSLIPIDEMIA: Status: ACTIVE | Noted: 2023-01-20

## 2023-01-20 PROBLEM — I25.10 CAD (CORONARY ARTERY DISEASE): Status: ACTIVE | Noted: 2023-01-20

## 2023-01-20 PROBLEM — Z86.79 HISTORY OF ATRIAL FIBRILLATION: Status: ACTIVE | Noted: 2023-01-20

## 2023-01-20 PROBLEM — I42.0 DILATED CARDIOMYOPATHY (CMD): Status: ACTIVE | Noted: 2023-01-20

## 2023-01-20 PROBLEM — I71.21 ASCENDING AORTIC ANEURYSM (CMD): Status: ACTIVE | Noted: 2023-01-20

## 2023-01-20 PROBLEM — I42.9 MYOCARDIOPATHY (CMD): Status: ACTIVE | Noted: 2023-01-20

## 2023-01-20 NOTE — TELEPHONE ENCOUNTER
LOV 12/29/22 (VV)    Future Appointments   Date Time Provider Tonia Maldonado   1/23/2023  7:30 AM ADAM Bell [facility]   2/9/2023  3:00 PM Morrill Desai, MD [facility]   2/9/2023  3:15 PM [facility] [id number]   2/22/2023  1:00 PM Celeste Lombardo MD [facility]EN [facility] [place]   4/25/2023  8:00 AM Amelia Ulrich,  [facility]     CAB sent last for #30 on 12/22/22. Looks like he sees cardio through [facility]. I think CAB may have sent as a favor for pt at the time. He was told to f/u with cardio or PCP. Sent to PCP;  He has seen her for HTN since he saw us

## 2023-01-23 ENCOUNTER — OFFICE VISIT (OUTPATIENT)
Dept: ENDOCRINOLOGY CLINIC | Facility: CLINIC | Age: 63
End: 2023-01-23
Payer: MEDICAID

## 2023-01-23 ENCOUNTER — TELEPHONE (OUTPATIENT)
Dept: ENDOCRINOLOGY CLINIC | Facility: CLINIC | Age: 63
End: 2023-01-23

## 2023-01-23 VITALS
DIASTOLIC BLOOD PRESSURE: 74 MMHG | OXYGEN SATURATION: 99 % | BODY MASS INDEX: 22 KG/M2 | HEART RATE: 70 BPM | SYSTOLIC BLOOD PRESSURE: 152 MMHG | WEIGHT: 177.63 LBS

## 2023-01-23 DIAGNOSIS — E78.5 DYSLIPIDEMIA: ICD-10-CM

## 2023-01-23 DIAGNOSIS — I50.9 CONGESTIVE HEART FAILURE, UNSPECIFIED HF CHRONICITY, UNSPECIFIED HEART FAILURE TYPE (HCC): ICD-10-CM

## 2023-01-23 DIAGNOSIS — Z79.4 TYPE 2 DIABETES MELLITUS WITH HYPERGLYCEMIA, WITH LONG-TERM CURRENT USE OF INSULIN (HCC): Primary | ICD-10-CM

## 2023-01-23 DIAGNOSIS — E11.65 TYPE 2 DIABETES MELLITUS WITH HYPERGLYCEMIA, WITH LONG-TERM CURRENT USE OF INSULIN (HCC): Primary | ICD-10-CM

## 2023-01-23 DIAGNOSIS — I10 ESSENTIAL HYPERTENSION, BENIGN: ICD-10-CM

## 2023-01-23 LAB
CARTRIDGE LOT#: 301 NUMERIC
HEMOGLOBIN A1C: 8.6 % (ref 4.3–5.6)

## 2023-01-23 PROCEDURE — 3077F SYST BP >= 140 MM HG: CPT | Performed by: NURSE PRACTITIONER

## 2023-01-23 PROCEDURE — 83036 HEMOGLOBIN GLYCOSYLATED A1C: CPT | Performed by: NURSE PRACTITIONER

## 2023-01-23 PROCEDURE — 99214 OFFICE O/P EST MOD 30 MIN: CPT | Performed by: NURSE PRACTITIONER

## 2023-01-23 PROCEDURE — 3052F HG A1C>EQUAL 8.0%<EQUAL 9.0%: CPT | Performed by: NURSE PRACTITIONER

## 2023-01-23 PROCEDURE — 3078F DIAST BP <80 MM HG: CPT | Performed by: NURSE PRACTITIONER

## 2023-01-23 RX ORDER — BLOOD-GLUCOSE SENSOR
1 EACH MISCELLANEOUS
Qty: 3 EACH | Refills: 12 | Status: SHIPPED | OUTPATIENT
Start: 2023-01-23

## 2023-01-23 RX ORDER — BLOOD-GLUCOSE,RECEIVER,CONT
1 EACH MISCELLANEOUS ONCE
Qty: 1 EACH | Refills: 0 | Status: SHIPPED | OUTPATIENT
Start: 2023-01-23 | End: 2023-01-23

## 2023-01-23 RX ORDER — BLOOD-GLUCOSE TRANSMITTER
EACH MISCELLANEOUS
Qty: 1 EACH | Refills: 3 | Status: SHIPPED | OUTPATIENT
Start: 2023-01-23

## 2023-01-23 RX ORDER — LISINOPRIL AND HYDROCHLOROTHIAZIDE 20; 12.5 MG/1; MG/1
1 TABLET ORAL DAILY
Qty: 90 TABLET | Refills: 1 | Status: SHIPPED | OUTPATIENT
Start: 2023-01-23

## 2023-01-25 ENCOUNTER — OFFICE VISIT (OUTPATIENT)
Dept: CARDIOLOGY | Age: 63
End: 2023-01-25

## 2023-01-25 VITALS
WEIGHT: 176.04 LBS | HEIGHT: 75 IN | DIASTOLIC BLOOD PRESSURE: 78 MMHG | SYSTOLIC BLOOD PRESSURE: 117 MMHG | BODY MASS INDEX: 21.89 KG/M2 | HEART RATE: 73 BPM | OXYGEN SATURATION: 97 %

## 2023-01-25 DIAGNOSIS — I25.10 CORONARY ARTERY DISEASE INVOLVING NATIVE CORONARY ARTERY OF NATIVE HEART WITHOUT ANGINA PECTORIS: ICD-10-CM

## 2023-01-25 DIAGNOSIS — R94.31 ABNORMAL EKG: ICD-10-CM

## 2023-01-25 DIAGNOSIS — E78.5 DYSLIPIDEMIA: ICD-10-CM

## 2023-01-25 DIAGNOSIS — I10 PRIMARY HYPERTENSION: Primary | ICD-10-CM

## 2023-01-25 DIAGNOSIS — E11.9 NON-INSULIN DEPENDENT TYPE 2 DIABETES MELLITUS (CMD): ICD-10-CM

## 2023-01-25 DIAGNOSIS — R06.02 SOB (SHORTNESS OF BREATH): ICD-10-CM

## 2023-01-25 DIAGNOSIS — I42.6 ALCOHOLIC CARDIOMYOPATHY (CMD): ICD-10-CM

## 2023-01-25 DIAGNOSIS — Z86.79 HISTORY OF ATRIAL FIBRILLATION: ICD-10-CM

## 2023-01-25 DIAGNOSIS — I71.21 ANEURYSM OF ASCENDING AORTA WITHOUT RUPTURE (CMD): ICD-10-CM

## 2023-01-25 PROCEDURE — 93000 ELECTROCARDIOGRAM COMPLETE: CPT | Performed by: SPECIALIST

## 2023-01-25 PROCEDURE — 3078F DIAST BP <80 MM HG: CPT | Performed by: SPECIALIST

## 2023-01-25 PROCEDURE — 99244 OFF/OP CNSLTJ NEW/EST MOD 40: CPT | Performed by: SPECIALIST

## 2023-01-25 PROCEDURE — 3074F SYST BP LT 130 MM HG: CPT | Performed by: SPECIALIST

## 2023-01-25 RX ORDER — GLIMEPIRIDE 2 MG/1
TABLET ORAL
COMMUNITY
Start: 2023-01-16 | End: 2023-06-21

## 2023-01-25 RX ORDER — CARVEDILOL 25 MG/1
25 TABLET ORAL 2 TIMES DAILY WITH MEALS
Qty: 180 TABLET | Refills: 1 | Status: SHIPPED | OUTPATIENT
Start: 2023-01-25 | End: 2023-06-21 | Stop reason: SDUPTHER

## 2023-01-25 RX ORDER — LISINOPRIL AND HYDROCHLOROTHIAZIDE 20; 12.5 MG/1; MG/1
1 TABLET ORAL DAILY
Qty: 90 TABLET | Refills: 1 | Status: SHIPPED | OUTPATIENT
Start: 2023-01-25 | End: 2023-06-21 | Stop reason: SDUPTHER

## 2023-01-25 RX ORDER — METFORMIN HYDROCHLORIDE 500 MG/1
500 TABLET, EXTENDED RELEASE ORAL 2 TIMES DAILY
COMMUNITY
Start: 2023-01-16

## 2023-01-25 RX ORDER — ATORVASTATIN CALCIUM 10 MG/1
TABLET, FILM COATED ORAL
COMMUNITY
Start: 2022-12-22 | End: 2023-01-25 | Stop reason: SDUPTHER

## 2023-01-25 RX ORDER — LISINOPRIL AND HYDROCHLOROTHIAZIDE 20; 12.5 MG/1; MG/1
1 TABLET ORAL DAILY
COMMUNITY
End: 2023-01-25 | Stop reason: SDUPTHER

## 2023-01-25 RX ORDER — CLOPIDOGREL BISULFATE 75 MG/1
75 TABLET ORAL DAILY
Qty: 90 TABLET | Refills: 1 | Status: SHIPPED | OUTPATIENT
Start: 2023-01-25 | End: 2023-03-08 | Stop reason: ALTCHOICE

## 2023-01-25 RX ORDER — AMLODIPINE BESYLATE 2.5 MG/1
2.5 TABLET ORAL DAILY
Qty: 90 TABLET | Refills: 1 | Status: SHIPPED | OUTPATIENT
Start: 2023-01-25 | End: 2023-06-21 | Stop reason: SDUPTHER

## 2023-01-25 RX ORDER — CLOPIDOGREL BISULFATE 75 MG/1
75 TABLET ORAL DAILY
Qty: 90 TABLET | Refills: 1 | Status: SHIPPED | OUTPATIENT
Start: 2023-01-25 | End: 2023-01-25 | Stop reason: SDUPTHER

## 2023-01-25 RX ORDER — BUPROPION HYDROCHLORIDE 200 MG/1
200 TABLET, EXTENDED RELEASE ORAL 2 TIMES DAILY
COMMUNITY
Start: 2022-12-30

## 2023-01-25 RX ORDER — CARVEDILOL 25 MG/1
1 TABLET ORAL 2 TIMES DAILY WITH MEALS
COMMUNITY
Start: 2022-12-30 | End: 2023-01-25 | Stop reason: SDUPTHER

## 2023-01-25 RX ORDER — ATORVASTATIN CALCIUM 10 MG/1
10 TABLET, FILM COATED ORAL DAILY
Qty: 90 TABLET | Refills: 1 | Status: SHIPPED | OUTPATIENT
Start: 2023-01-25 | End: 2023-06-21 | Stop reason: DRUGHIGH

## 2023-01-25 RX ORDER — AMLODIPINE BESYLATE 2.5 MG/1
TABLET ORAL
Qty: 90 TABLET | Refills: 0 | OUTPATIENT
Start: 2023-01-25

## 2023-01-25 RX ORDER — AMLODIPINE BESYLATE 2.5 MG/1
TABLET ORAL
COMMUNITY
Start: 2022-12-22 | End: 2023-01-25 | Stop reason: SDUPTHER

## 2023-01-25 NOTE — TELEPHONE ENCOUNTER
Called edna PA need starting PA through [facility] attched OV notes, face sheet and insurance card.  Faxed too [phone]

## 2023-01-25 NOTE — TELEPHONE ENCOUNTER
Hypertension Medications Protocol Passed 01/20/2023 10:48 AM   Protocol Details  CMP or BMP in past 12 months    Last serum creatinine< 2.0    Appointment in past 6 or next 3 months        LOV    LAST LAB    LAST RX last refill  2018     Next OV   Future Appointments   Date Time Provider Tonia Erica   2/9/2023  3:00 PM Amberly Blue MD [facility]   2/9/2023  3:15 PM [facility] [id number]   2/22/2023  1:00 PM Madeleine Daniels MD ENIWARRCJ [facility] [place]   4/25/2023  8:00 AM Rosy Ulrich DO [facility]   5/22/2023  7:30 AM Fabricio Hanson APRN [facility]         PROTOCOL pass

## 2023-01-26 NOTE — TELEPHONE ENCOUNTER
Pt approved called [facility] for pt to schedule for training went through at pharmacy for no cost. Approved till 1/26/24 sending to scan

## 2023-01-31 ENCOUNTER — DIABETIC EDUCATION (OUTPATIENT)
Dept: ENDOCRINOLOGY CLINIC | Facility: CLINIC | Age: 63
End: 2023-01-31
Payer: MEDICAID

## 2023-01-31 DIAGNOSIS — Z79.4 TYPE 2 DIABETES MELLITUS WITH HYPERGLYCEMIA, WITH LONG-TERM CURRENT USE OF INSULIN (HCC): Primary | ICD-10-CM

## 2023-01-31 DIAGNOSIS — E11.65 TYPE 2 DIABETES MELLITUS WITH HYPERGLYCEMIA, WITH LONG-TERM CURRENT USE OF INSULIN (HCC): Primary | ICD-10-CM

## 2023-01-31 PROCEDURE — 95249 CONT GLUC MNTR PT PROV EQP: CPT | Performed by: DIETITIAN, REGISTERED

## 2023-01-31 NOTE — PROGRESS NOTES
Austin Nix  3/7/1960 was started on Dexcom G6 Continuous Glucose Sensor    1/31/2023    Referring Provider: Corinne Maher Start time: 12:30 End time: 1:00    Pt is using the jaja on his phone. Comments: Pt is now streaming on Dexcom Clarity      Explained sensor to start recording in 2 hours        1. Test blood glucose if symptoms do not match sensor reading. 2. How to handle problems like MRI, CT scans, travel, etc.   3. Explained how to enter sensor code q 10 days and transmitter ID q 3 months   4. Showed pt how to change high/low alert as well as to see when sensor expires   5. Reviewed how to remove/separate transmitter from sensor in 10 days  6. Reviewed sensor expiration alerts 6 hours, 2 hours, 30 minutes and option to end sensor session early and why patient may want to end sensor before it expires   7. Instructed pt not to inject insulin within 3 inches of sensor   8. If patient using the Dexcom G6 mobile jaja, instructed pt to keep the Dexcom G6 mobile jaja open to avoid disconnection    9. Instructed pt they will have to return to QID fingerstick BG testing temporarily if out of sensors/reader/transmitter. 10. Explained it is water-proof   11. At airports, can go through metal detector but not full body scanner. 12. Pt provided with [facility] Customer Support phone # [phone]. Sensor Settings:  High Alarm: turned off   Low Alarm: 80    Reviewed written material provided with product. Patient verbalized understanding and has no further questions at this time. Thank you for the referral.  Patient to follow-up with diabetes provider 5/22/23   Patient to contact diabetes center for questions/concerns.   Cameron Holden MS, RD, CDCES, LDN

## 2023-02-09 ENCOUNTER — OFFICE VISIT (OUTPATIENT)
Facility: LOCATION | Age: 63
End: 2023-02-09
Payer: MEDICAID

## 2023-02-09 DIAGNOSIS — G51.0 BELL'S PALSY: ICD-10-CM

## 2023-02-09 DIAGNOSIS — H93.13 TINNITUS OF BOTH EARS: ICD-10-CM

## 2023-02-09 DIAGNOSIS — H90.3 SENSORINEURAL HEARING LOSS (SNHL) OF BOTH EARS: Primary | ICD-10-CM

## 2023-02-09 DIAGNOSIS — H93.13 TINNITUS OF BOTH EARS: Primary | ICD-10-CM

## 2023-02-09 PROCEDURE — 92567 TYMPANOMETRY: CPT | Performed by: AUDIOLOGIST

## 2023-02-09 PROCEDURE — 99203 OFFICE O/P NEW LOW 30 MIN: CPT | Performed by: OTOLARYNGOLOGY

## 2023-02-09 PROCEDURE — 92557 COMPREHENSIVE HEARING TEST: CPT | Performed by: AUDIOLOGIST

## 2023-02-09 NOTE — PROGRESS NOTES
Cristin [place] was seen for an audiometric evaluation and tympanogram today. Referred back to physician. Hearing aid evaluation recommended. Patient is not interested at this time.     Joss Kowalski MS, CCC-A

## 2023-02-13 ENCOUNTER — PATIENT MESSAGE (OUTPATIENT)
Facility: LOCATION | Age: 63
End: 2023-02-13

## 2023-02-13 RX ORDER — DIAZEPAM 5 MG/1
5 TABLET ORAL EVERY 8 HOURS PRN
Qty: 2 TABLET | Refills: 0 | Status: ON HOLD | OUTPATIENT
Start: 2023-02-13 | End: 2023-02-20

## 2023-02-13 NOTE — TELEPHONE ENCOUNTER
From: Augustine Dalton  To: Darcie Leventhal, MD  Sent: 2/13/2023 11:40 AM CST  Subject: Yovani Finley,  Could you please prescribe a sensitive for my upcoming MRI? I am terribly clastrophibic. The procedure is scheduled for next Monday Feb 20. Thank you.   Sincerely,  Montrell Peterson

## 2023-02-14 RX ORDER — DIAZEPAM 5 MG/1
5 TABLET ORAL EVERY 6 HOURS PRN
Qty: 3 TABLET | Refills: 0 | Status: ON HOLD | OUTPATIENT
Start: 2023-02-14

## 2023-02-20 ENCOUNTER — APPOINTMENT (OUTPATIENT)
Dept: GENERAL RADIOLOGY | Facility: HOSPITAL | Age: 63
DRG: 065 | End: 2023-02-20
Attending: EMERGENCY MEDICINE
Payer: MEDICAID

## 2023-02-20 ENCOUNTER — APPOINTMENT (OUTPATIENT)
Dept: ULTRASOUND IMAGING | Facility: HOSPITAL | Age: 63
End: 2023-02-20
Attending: Other
Payer: MEDICAID

## 2023-02-20 ENCOUNTER — APPOINTMENT (OUTPATIENT)
Dept: GENERAL RADIOLOGY | Facility: HOSPITAL | Age: 63
End: 2023-02-20
Attending: EMERGENCY MEDICINE
Payer: MEDICAID

## 2023-02-20 ENCOUNTER — HOSPITAL ENCOUNTER (OUTPATIENT)
Dept: MRI IMAGING | Age: 63
Discharge: HOME | End: 2023-02-20
Attending: OTOLARYNGOLOGY
Payer: MEDICAID

## 2023-02-20 ENCOUNTER — APPOINTMENT (OUTPATIENT)
Dept: ULTRASOUND IMAGING | Facility: HOSPITAL | Age: 63
DRG: 065 | End: 2023-02-20
Attending: Other
Payer: MEDICAID

## 2023-02-20 ENCOUNTER — HOSPITAL ENCOUNTER (INPATIENT)
Facility: HOSPITAL | Age: 63
LOS: 2 days | Discharge: HOME | DRG: 065 | End: 2023-02-22
Attending: EMERGENCY MEDICINE | Admitting: HOSPITALIST
Payer: MEDICAID

## 2023-02-20 ENCOUNTER — HOSPITAL ENCOUNTER (OUTPATIENT)
Dept: MRI IMAGING | Age: 63
Discharge: HOME | DRG: 065 | End: 2023-02-20
Attending: OTOLARYNGOLOGY
Payer: MEDICAID

## 2023-02-20 ENCOUNTER — HOSPITAL ENCOUNTER (INPATIENT)
Facility: HOSPITAL | Age: 63
LOS: 2 days | Discharge: HOME | End: 2023-02-22
Attending: EMERGENCY MEDICINE | Admitting: HOSPITALIST
Payer: MEDICAID

## 2023-02-20 DIAGNOSIS — E78.5 DYSLIPIDEMIA: ICD-10-CM

## 2023-02-20 DIAGNOSIS — I63.9 ACUTE CVA (CEREBROVASCULAR ACCIDENT) (HCC): Primary | ICD-10-CM

## 2023-02-20 DIAGNOSIS — Z79.4 TYPE 2 DIABETES MELLITUS WITH HYPERGLYCEMIA, WITH LONG-TERM CURRENT USE OF INSULIN (HCC): ICD-10-CM

## 2023-02-20 DIAGNOSIS — I10 ESSENTIAL HYPERTENSION, BENIGN: ICD-10-CM

## 2023-02-20 DIAGNOSIS — E11.65 TYPE 2 DIABETES MELLITUS WITH HYPERGLYCEMIA, WITHOUT LONG-TERM CURRENT USE OF INSULIN (HCC): ICD-10-CM

## 2023-02-20 DIAGNOSIS — I42.0 DILATED CARDIOMYOPATHY (HCC): ICD-10-CM

## 2023-02-20 DIAGNOSIS — I25.10 CORONARY ARTERY DISEASE INVOLVING NATIVE HEART WITHOUT ANGINA PECTORIS, UNSPECIFIED VESSEL OR LESION TYPE: ICD-10-CM

## 2023-02-20 DIAGNOSIS — H93.13 TINNITUS OF BOTH EARS: ICD-10-CM

## 2023-02-20 DIAGNOSIS — G51.0 BELL'S PALSY: ICD-10-CM

## 2023-02-20 DIAGNOSIS — E11.65 TYPE 2 DIABETES MELLITUS WITH HYPERGLYCEMIA, WITH LONG-TERM CURRENT USE OF INSULIN (HCC): ICD-10-CM

## 2023-02-20 DIAGNOSIS — I48.0 PAROXYSMAL ATRIAL FIBRILLATION (HCC): ICD-10-CM

## 2023-02-20 LAB
ALBUMIN SERPL-MCNC: 3.3 G/DL (ref 3.4–5)
ALBUMIN/GLOB SERPL: 0.9 {RATIO} (ref 1–2)
ALP LIVER SERPL-CCNC: 88 U/L
ALT SERPL-CCNC: 18 U/L
ANION GAP SERPL CALC-SCNC: 3 MMOL/L (ref 0–18)
APTT PPP: 32 SECONDS (ref 23.3–35.6)
AST SERPL-CCNC: 16 U/L (ref 15–37)
ATRIAL RATE: 63 BPM
BASOPHILS # BLD AUTO: 0.03 X10(3) UL (ref 0–0.2)
BASOPHILS NFR BLD AUTO: 0.5 %
BILIRUB SERPL-MCNC: 0.8 MG/DL (ref 0.1–2)
BUN BLD-MCNC: 18 MG/DL (ref 7–18)
CALCIUM BLD-MCNC: 8.7 MG/DL (ref 8.5–10.1)
CHLORIDE SERPL-SCNC: 104 MMOL/L (ref 98–112)
CHOLEST SERPL-MCNC: 138 MG/DL (ref ?–200)
CO2 SERPL-SCNC: 33 MMOL/L (ref 21–32)
CREAT BLD-MCNC: 1.24 MG/DL
EOSINOPHIL # BLD AUTO: 0.62 X10(3) UL (ref 0–0.7)
EOSINOPHIL NFR BLD AUTO: 10.7 %
ERYTHROCYTE [DISTWIDTH] IN BLOOD BY AUTOMATED COUNT: 12.5 %
GFR SERPLBLD BASED ON 1.73 SQ M-ARVRAT: 67 ML/MIN/1.73M2 (ref 60–?)
GLOBULIN PLAS-MCNC: 3.6 G/DL (ref 2.8–4.4)
GLUCOSE BLD-MCNC: 110 MG/DL (ref 70–99)
GLUCOSE BLD-MCNC: 156 MG/DL (ref 70–99)
GLUCOSE BLD-MCNC: 85 MG/DL (ref 70–99)
GLUCOSE BLD-MCNC: 96 MG/DL (ref 70–99)
HCT VFR BLD AUTO: 50.4 %
HDLC SERPL-MCNC: 40 MG/DL (ref 40–59)
HGB BLD-MCNC: 17.2 G/DL
IMM GRANULOCYTES # BLD AUTO: 0.01 X10(3) UL (ref 0–1)
IMM GRANULOCYTES NFR BLD: 0.2 %
INR BLD: 1.09 (ref 0.85–1.16)
LDLC SERPL CALC-MCNC: 85 MG/DL (ref ?–100)
LYMPHOCYTES # BLD AUTO: 2.2 X10(3) UL (ref 1–4)
LYMPHOCYTES NFR BLD AUTO: 37.8 %
MCH RBC QN AUTO: 30 PG (ref 26–34)
MCHC RBC AUTO-ENTMCNC: 34.1 G/DL (ref 31–37)
MCV RBC AUTO: 88 FL
MONOCYTES # BLD AUTO: 0.47 X10(3) UL (ref 0.1–1)
MONOCYTES NFR BLD AUTO: 8.1 %
NEUTROPHILS # BLD AUTO: 2.49 X10 (3) UL (ref 1.5–7.7)
NEUTROPHILS # BLD AUTO: 2.49 X10(3) UL (ref 1.5–7.7)
NEUTROPHILS NFR BLD AUTO: 42.7 %
NONHDLC SERPL-MCNC: 98 MG/DL (ref ?–130)
OSMOLALITY SERPL CALC.SUM OF ELEC: 295 MOSM/KG (ref 275–295)
P AXIS: 74 DEGREES
P-R INTERVAL: 212 MS
PLATELET # BLD AUTO: 206 10(3)UL (ref 150–450)
POTASSIUM SERPL-SCNC: 3.2 MMOL/L (ref 3.5–5.1)
PROT SERPL-MCNC: 6.9 G/DL (ref 6.4–8.2)
PROTHROMBIN TIME: 14.1 SECONDS (ref 11.6–14.8)
Q-T INTERVAL: 470 MS
QRS DURATION: 170 MS
QTC CALCULATION (BEZET): 480 MS
R AXIS: -69 DEGREES
RBC # BLD AUTO: 5.73 X10(6)UL
SARS-COV-2 RNA RESP QL NAA+PROBE: NOT DETECTED
SODIUM SERPL-SCNC: 140 MMOL/L (ref 136–145)
T AXIS: 67 DEGREES
TRIGL SERPL-MCNC: 61 MG/DL (ref 30–149)
TROPONIN I HIGH SENSITIVITY: 36 NG/L
TSI SER-ACNC: 1.11 MIU/ML (ref 0.36–3.74)
VENTRICULAR RATE: 63 BPM
VLDLC SERPL CALC-MCNC: 10 MG/DL (ref 0–30)
WBC # BLD AUTO: 5.8 X10(3) UL (ref 4–11)

## 2023-02-20 PROCEDURE — 70553 MRI BRAIN STEM W/O & W/DYE: CPT | Performed by: OTOLARYNGOLOGY

## 2023-02-20 PROCEDURE — 71045 X-RAY EXAM CHEST 1 VIEW: CPT | Performed by: EMERGENCY MEDICINE

## 2023-02-20 PROCEDURE — A9575 INJ GADOTERATE MEGLUMI 0.1ML: HCPCS

## 2023-02-20 PROCEDURE — 93880 EXTRACRANIAL BILAT STUDY: CPT | Performed by: OTHER

## 2023-02-20 PROCEDURE — 99255 IP/OBS CONSLTJ NEW/EST HI 80: CPT | Performed by: OTHER

## 2023-02-20 RX ORDER — DIAZEPAM 5 MG/1
5 TABLET ORAL EVERY 8 HOURS PRN
Status: DISCONTINUED | OUTPATIENT
Start: 2023-02-20 | End: 2023-02-22

## 2023-02-20 RX ORDER — DEXTROSE MONOHYDRATE 25 G/50ML
50 INJECTION, SOLUTION INTRAVENOUS
Status: DISCONTINUED | OUTPATIENT
Start: 2023-02-20 | End: 2023-02-22

## 2023-02-20 RX ORDER — ATORVASTATIN CALCIUM 10 MG/1
10 TABLET, FILM COATED ORAL NIGHTLY
Status: DISCONTINUED | OUTPATIENT
Start: 2023-02-20 | End: 2023-02-20

## 2023-02-20 RX ORDER — SODIUM CHLORIDE 9 MG/ML
INJECTION, SOLUTION INTRAVENOUS CONTINUOUS
Status: DISCONTINUED | OUTPATIENT
Start: 2023-02-20 | End: 2023-02-20

## 2023-02-20 RX ORDER — NICOTINE POLACRILEX 4 MG
15 LOZENGE BUCCAL
Status: DISCONTINUED | OUTPATIENT
Start: 2023-02-20 | End: 2023-02-22

## 2023-02-20 RX ORDER — CLOPIDOGREL BISULFATE 75 MG/1
75 TABLET ORAL DAILY
COMMUNITY
Start: 2023-01-25 | End: 2023-02-22

## 2023-02-20 RX ORDER — ENOXAPARIN SODIUM 100 MG/ML
40 INJECTION SUBCUTANEOUS NIGHTLY
Status: DISCONTINUED | OUTPATIENT
Start: 2023-02-20 | End: 2023-02-22

## 2023-02-20 RX ORDER — LABETALOL HYDROCHLORIDE 5 MG/ML
10 INJECTION, SOLUTION INTRAVENOUS EVERY 10 MIN PRN
Status: DISCONTINUED | OUTPATIENT
Start: 2023-02-20 | End: 2023-02-22

## 2023-02-20 RX ORDER — PROCHLORPERAZINE EDISYLATE 5 MG/ML
5 INJECTION INTRAMUSCULAR; INTRAVENOUS EVERY 8 HOURS PRN
Status: DISCONTINUED | OUTPATIENT
Start: 2023-02-20 | End: 2023-02-22

## 2023-02-20 RX ORDER — GADOTERATE MEGLUMINE 376.9 MG/ML
20 INJECTION INTRAVENOUS
Status: COMPLETED | OUTPATIENT
Start: 2023-02-20 | End: 2023-02-20

## 2023-02-20 RX ORDER — CLOPIDOGREL BISULFATE 75 MG/1
75 TABLET ORAL DAILY
Status: DISCONTINUED | OUTPATIENT
Start: 2023-02-21 | End: 2023-02-22

## 2023-02-20 RX ORDER — ACETAMINOPHEN 650 MG/1
650 SUPPOSITORY RECTAL EVERY 4 HOURS PRN
Status: DISCONTINUED | OUTPATIENT
Start: 2023-02-20 | End: 2023-02-22

## 2023-02-20 RX ORDER — CARVEDILOL 12.5 MG/1
12.5 TABLET ORAL 2 TIMES DAILY WITH MEALS
Status: DISCONTINUED | OUTPATIENT
Start: 2023-02-20 | End: 2023-02-22

## 2023-02-20 RX ORDER — NICOTINE POLACRILEX 4 MG
30 LOZENGE BUCCAL
Status: DISCONTINUED | OUTPATIENT
Start: 2023-02-20 | End: 2023-02-22

## 2023-02-20 RX ORDER — ATORVASTATIN CALCIUM 80 MG/1
80 TABLET, FILM COATED ORAL NIGHTLY
Status: DISCONTINUED | OUTPATIENT
Start: 2023-02-20 | End: 2023-02-22

## 2023-02-20 RX ORDER — HYDRALAZINE HYDROCHLORIDE 20 MG/ML
10 INJECTION INTRAMUSCULAR; INTRAVENOUS EVERY 2 HOUR PRN
Status: DISCONTINUED | OUTPATIENT
Start: 2023-02-20 | End: 2023-02-22

## 2023-02-20 RX ORDER — BUPROPION HYDROCHLORIDE 100 MG/1
200 TABLET, EXTENDED RELEASE ORAL 2 TIMES DAILY
Status: DISCONTINUED | OUTPATIENT
Start: 2023-02-20 | End: 2023-02-22

## 2023-02-20 RX ORDER — ONDANSETRON 2 MG/ML
4 INJECTION INTRAMUSCULAR; INTRAVENOUS EVERY 6 HOURS PRN
Status: DISCONTINUED | OUTPATIENT
Start: 2023-02-20 | End: 2023-02-22

## 2023-02-20 RX ORDER — ACETAMINOPHEN 325 MG/1
650 TABLET ORAL EVERY 4 HOURS PRN
Status: DISCONTINUED | OUTPATIENT
Start: 2023-02-20 | End: 2023-02-22

## 2023-02-20 RX ADMIN — GADOTERATE MEGLUMINE 16 ML: 376.9 INJECTION INTRAVENOUS at 09:21:00

## 2023-02-20 NOTE — ED INITIAL ASSESSMENT (HPI)
Patient to ER w/ complaints of difficulty keeping his right eye open,. States when he is chewing, his eye closes. +known bells palsy. Symptoms began 16 months ago. Patient advised to come to ER from MRI department.

## 2023-02-20 NOTE — ED QUICK NOTES
Orders for admission, patient is aware of plan and ready to go upstairs. Any questions, please call ED RN Ammy Meza at extension [phone].      Patient Covid vaccination status: Fully vaccinated     COVID Test Ordered in ED: Rapid SARS-CoV-2 by PCR    COVID Suspicion at Admission: Low clinical suspicion for COVID    Running Infusions:  None    Mental Status/LOC at time of transport: a/ox4    Other pertinent information:   CIWA score: N/A   NIH score:  1

## 2023-02-21 ENCOUNTER — APPOINTMENT (OUTPATIENT)
Dept: CV DIAGNOSTICS | Facility: HOSPITAL | Age: 63
DRG: 065 | End: 2023-02-21
Attending: Other
Payer: MEDICAID

## 2023-02-21 ENCOUNTER — APPOINTMENT (OUTPATIENT)
Dept: CV DIAGNOSTICS | Facility: HOSPITAL | Age: 63
End: 2023-02-21
Attending: Other
Payer: MEDICAID

## 2023-02-21 ENCOUNTER — APPOINTMENT (OUTPATIENT)
Dept: MRI IMAGING | Facility: HOSPITAL | Age: 63
DRG: 065 | End: 2023-02-21
Attending: Other
Payer: MEDICAID

## 2023-02-21 ENCOUNTER — APPOINTMENT (OUTPATIENT)
Dept: MRI IMAGING | Facility: HOSPITAL | Age: 63
End: 2023-02-21
Attending: Other
Payer: MEDICAID

## 2023-02-21 LAB
GLUCOSE BLD-MCNC: 100 MG/DL (ref 70–99)
GLUCOSE BLD-MCNC: 106 MG/DL (ref 70–99)
GLUCOSE BLD-MCNC: 109 MG/DL (ref 70–99)
GLUCOSE BLD-MCNC: 85 MG/DL (ref 70–99)
POTASSIUM SERPL-SCNC: 3.2 MMOL/L (ref 3.5–5.1)
POTASSIUM SERPL-SCNC: 3.6 MMOL/L (ref 3.5–5.1)

## 2023-02-21 PROCEDURE — 93306 TTE W/DOPPLER COMPLETE: CPT | Performed by: OTHER

## 2023-02-21 PROCEDURE — 99232 SBSQ HOSP IP/OBS MODERATE 35: CPT | Performed by: NURSE PRACTITIONER

## 2023-02-21 PROCEDURE — 99233 SBSQ HOSP IP/OBS HIGH 50: CPT | Performed by: HOSPITALIST

## 2023-02-21 PROCEDURE — 70544 MR ANGIOGRAPHY HEAD W/O DYE: CPT | Performed by: OTHER

## 2023-02-21 RX ORDER — POTASSIUM CHLORIDE 20 MEQ/1
40 TABLET, EXTENDED RELEASE ORAL EVERY 4 HOURS
Status: COMPLETED | OUTPATIENT
Start: 2023-02-21 | End: 2023-02-21

## 2023-02-21 RX ORDER — AMLODIPINE BESYLATE 2.5 MG/1
2.5 TABLET ORAL DAILY
Status: DISCONTINUED | OUTPATIENT
Start: 2023-02-21 | End: 2023-02-22

## 2023-02-21 RX ORDER — DIAZEPAM 5 MG/1
5 TABLET ORAL
Status: COMPLETED | OUTPATIENT
Start: 2023-02-21 | End: 2023-02-21

## 2023-02-21 NOTE — PROGRESS NOTES
Stroke binder given to patient; the following education was provided:     BEFAST - Stroke warning signs and symptoms  Personalized risk factors including HL, HTN, CAD, DM, Afib  Need for follow-up after discharge  How to activate EMS for stroke  Healthy lifestyle (nutrition and exercise)    No family present during education. Patient receptive to teachings. All pertinent questions and concerns were addressed. Patient discharge instructions (References/Attachments) updated.        Arvin Acosta RN, BSN  Stroke Navigator  [phone]

## 2023-02-21 NOTE — PROGRESS NOTES
02/20/23 1911   Clinical Encounter Type   Visited With Patient and family together  (Ex-wife and daughter at bedside)   Routine Visit Follow-up   Continue Visiting No   Family Spiritual Encounters   Family Coping Accepting;Non-verbal;Sadness   Family Participation in [address] During Treatment Consistently   Taxonomy   Intended Effects Establish rapport and connectedness   Methods Encourage sharing of feelings; Offer emotional support; Offer spiritual/Christian support   Interventions Acknowledge response to difficult experience; Active listening;[place];Provide compassionate touch

## 2023-02-21 NOTE — PLAN OF CARE
NURSING ADMISSION NOTE      Patient admitted via Cart  Oriented to room. Safety precautions initiated. Bed in low position. Call light in reach. Received pt from ED this afternoon. Oriented pt and family to room and unit. Admission navigator completed. Pt AxOx4, SR w/ 1 AVB, RA  Neuro checks q2 until 2130. Right facial droop and tongue deviation noted.  Baseline neuropathy per pt  No pain  US carotid completed  MRA and echo tbd  PT/OT/ST to see  Speech therapy unable to see until tomorrow, pt updated  Family at bedside

## 2023-02-22 VITALS
SYSTOLIC BLOOD PRESSURE: 142 MMHG | HEIGHT: 75 IN | OXYGEN SATURATION: 99 % | RESPIRATION RATE: 18 BRPM | WEIGHT: 177 LBS | BODY MASS INDEX: 22.01 KG/M2 | HEART RATE: 73 BPM | DIASTOLIC BLOOD PRESSURE: 75 MMHG | TEMPERATURE: 98 F

## 2023-02-22 LAB
GLUCOSE BLD-MCNC: 100 MG/DL (ref 70–99)
GLUCOSE BLD-MCNC: 90 MG/DL (ref 70–99)
POTASSIUM SERPL-SCNC: 3.9 MMOL/L (ref 3.5–5.1)

## 2023-02-22 PROCEDURE — 99232 SBSQ HOSP IP/OBS MODERATE 35: CPT | Performed by: NURSE PRACTITIONER

## 2023-02-22 PROCEDURE — 99239 HOSP IP/OBS DSCHRG MGMT >30: CPT | Performed by: STUDENT IN AN ORGANIZED HEALTH CARE EDUCATION/TRAINING PROGRAM

## 2023-02-22 RX ORDER — EMPAGLIFLOZIN 25 MG/1
1 TABLET, FILM COATED ORAL DAILY
Qty: 90 TABLET | Refills: 1 | Status: SHIPPED | OUTPATIENT
Start: 2023-02-22 | End: 2023-03-24

## 2023-02-22 RX ORDER — ASPIRIN 81 MG/1
81 TABLET ORAL DAILY
Qty: 30 TABLET | Refills: 11 | Status: SHIPPED | OUTPATIENT
Start: 2023-02-22

## 2023-02-22 RX ORDER — ASPIRIN 81 MG/1
81 TABLET ORAL DAILY
Status: DISCONTINUED | OUTPATIENT
Start: 2023-02-23 | End: 2023-02-22

## 2023-02-22 RX ORDER — ATORVASTATIN CALCIUM 80 MG/1
80 TABLET, FILM COATED ORAL NIGHTLY
Qty: 30 TABLET | Refills: 2 | Status: SHIPPED | OUTPATIENT
Start: 2023-02-22

## 2023-02-22 RX ORDER — LISINOPRIL AND HYDROCHLOROTHIAZIDE 20; 12.5 MG/1; MG/1
1 TABLET ORAL DAILY
Status: DISCONTINUED | OUTPATIENT
Start: 2023-02-22 | End: 2023-02-22

## 2023-02-22 NOTE — PLAN OF CARE
Assumed care at 0730  Orientated x4, NSR, RA   Denies pain     Nq4; no new neuro deficits   BP elevated, within neuro parameters; BP meds restarted per MD   Cardiology consulted  ECHO completed   PT- OP PT   Pending MRA, valium PRN ordered   Tolerating diet   Needs met, call light within reach     Plan for MRA tonight; cardiac monitoring at discharge

## 2023-02-22 NOTE — PLAN OF CARE
Problem: Diabetes/Glucose Control  Goal: Glucose maintained within prescribed range  Description: INTERVENTIONS:  - Monitor Blood Glucose as ordered  - Assess for signs and symptoms of hyperglycemia and hypoglycemia  - Administer ordered medications to maintain glucose within target range  - Assess barriers to adequate nutritional intake and initiate nutrition consult as needed  - Instruct patient on self management of diabetes  Outcome: Adequate for Discharge     Problem: NEUROLOGICAL - ADULT  Goal: Achieves stable or improved neurological status  Description: INTERVENTIONS  - Assess for and report changes in neurological status  - Initiate measures to prevent increased intracranial pressure  - Maintain blood pressure and fluid volume within ordered parameters to optimize cerebral perfusion and minimize risk of hemorrhage  - Monitor temperature, glucose, and sodium.  Initiate appropriate interventions as ordered  Outcome: Adequate for Discharge  Goal: Absence of seizures  Description: INTERVENTIONS  - Monitor for seizure activity  - Administer anti-seizure medications as ordered  - Monitor neurological status  Outcome: Adequate for Discharge  Goal: Remains free of injury related to seizure activity  Description: INTERVENTIONS:  - Maintain airway, patient safety  and administer oxygen as ordered  - Monitor patient for seizure activity, document and report duration and description of seizure to MD/LIP  - If seizure occurs, turn patient to side and suction secretions as needed  - Reorient patient post seizure  - Seizure pads on all 4 side rails  - Instruct patient/family to notify RN of any seizure activity  - Instruct patient/family to call for assistance with activity based on assessment  Outcome: Adequate for Discharge  Goal: Achieves maximal functionality and self care  Description: INTERVENTIONS  - Monitor swallowing and airway patency with patient fatigue and changes in neurological status  - Encourage and assist patient to increase activity and self care with guidance from PT/OT  - Encourage visually impaired, hearing impaired and aphasic patients to use assistive/communication devices  - Provide adequate time for patient to answer and ask questions and look and read.    Outcome: Adequate for Discharge

## 2023-02-22 NOTE — PROGRESS NOTES
NURSING DISCHARGE NOTE    Assumed care of pt at 0730 am  Neuro assessments per order. No new deficits noted. Patient medically cleared to discharge home per all consults. Stroke binder given and stroke education completed. Meds to bed able to fill Xarelto Rx with one month free Rx. Discharge AVS completed. Discharge instructions, orders, and Rx's given and reviewed. All questions answered, pt v/u. Pt discharged home. Discharged Home via Wheelchair. Accompanied by Family member and Support staff  Belongings Taken by patient/family.

## 2023-02-22 NOTE — PLAN OF CARE
Assumed care @ 1900. On telemetry monitoring. Updated patient with plan of care  Ensures patient safety. Maintained a calm and safe environment. Side rails up x2. Needs attended. Call light within reach, will continue to monitor. Instructed to call when assistance needed. Left in bed resting comfortably. Problem: Diabetes/Glucose Control  Goal: Glucose maintained within prescribed range  Description: INTERVENTIONS:  - Monitor Blood Glucose as ordered  - Assess for signs and symptoms of hyperglycemia and hypoglycemia  - Administer ordered medications to maintain glucose within target range  - Assess barriers to adequate nutritional intake and initiate nutrition consult as needed  - Instruct patient on self management of diabetes  2/21/2023 2321 by Guillermo Felix RN  Outcome: Progressing  2/21/2023 2320 by Guillermo Felix RN  Outcome: Progressing     Problem: NEUROLOGICAL - ADULT  Goal: Achieves stable or improved neurological status  Description: INTERVENTIONS  - Assess for and report changes in neurological status  - Initiate measures to prevent increased intracranial pressure  - Maintain blood pressure and fluid volume within ordered parameters to optimize cerebral perfusion and minimize risk of hemorrhage  - Monitor temperature, glucose, and sodium.  Initiate appropriate interventions as ordered  2/21/2023 2321 by Guillermo Felix RN  Outcome: Progressing  2/21/2023 2320 by Guillermo Felix RN  Outcome: Progressing  Goal: Absence of seizures  Description: INTERVENTIONS  - Monitor for seizure activity  - Administer anti-seizure medications as ordered  - Monitor neurological status  2/21/2023 2321 by Guillermo Felix RN  Outcome: Progressing  2/21/2023 2320 by Guillermo Felix RN  Outcome: Progressing  Goal: Remains free of injury related to seizure activity  Description: INTERVENTIONS:  - Maintain airway, patient safety  and administer oxygen as ordered  - Monitor patient for seizure activity, document and report duration and description of seizure to MD/LIP  - If seizure occurs, turn patient to side and suction secretions as needed  - Reorient patient post seizure  - Seizure pads on all 4 side rails  - Instruct patient/family to notify RN of any seizure activity  - Instruct patient/family to call for assistance with activity based on assessment  2/21/2023 2321 by Maynor Urrutia RN  Outcome: Progressing  2/21/2023 2320 by Maynor Urrutia RN  Outcome: Progressing  Goal: Achieves maximal functionality and self care  Description: INTERVENTIONS  - Monitor swallowing and airway patency with patient fatigue and changes in neurological status  - Encourage and assist patient to increase activity and self care with guidance from PT/OT  - Encourage visually impaired, hearing impaired and aphasic patients to use assistive/communication devices  2/21/2023 2321 by Maynor Urrutia RN  Outcome: Progressing  2/21/2023 2320 by Maynor Urrutia RN  Outcome: Progressing

## 2023-02-22 NOTE — CM/SW NOTE
02/22/23 1400   CM/SW Referral Data   Referral Source Social Work (self-referral)  (OT)   Reason for Referral PHQ4/PHQ9; Discharge planning   Informant Patient   Patient [address]   Patient lives with Alone   Discharge Needs   Anticipated D/C needs Outpatient therapy   SW met with pt at bedside to address PHQ9 result. Pt has hx with Depression, on medication. Pt resides home alone, however children and ex wife are supportive and involved. Pt works for [employer], which he states he picks and chooses when to work, however issues with having available work. Inquiring on services offered for pt due to concerns with fiances. Pt states he is comfortable for the time being, but looking at long term. SW provided resource on AVS for [address] explained that as pt is under age of 74 unable to make referral.     Pt states that he has been putting off looking for a therapist in network with insurance. SW looked on [facility] [employer], printed list of in network providers. Also printed page for pt to enroll in Care Coordination that way he has a CM involved in care. Information also on dc paperwork. Pt appreciative of resources, support provided. RN aware.     Juan [place], [facility]  Discharge [facility]

## 2023-02-23 ENCOUNTER — PATIENT OUTREACH (OUTPATIENT)
Dept: CASE MANAGEMENT | Age: 63
End: 2023-02-23

## 2023-02-23 ENCOUNTER — TELEPHONE (OUTPATIENT)
Dept: CARDIOLOGY | Age: 63
End: 2023-02-23

## 2023-02-24 ENCOUNTER — OFFICE VISIT (OUTPATIENT)
Dept: INTERNAL MEDICINE CLINIC | Facility: CLINIC | Age: 63
End: 2023-02-24
Payer: MEDICAID

## 2023-02-24 VITALS
OXYGEN SATURATION: 98 % | SYSTOLIC BLOOD PRESSURE: 104 MMHG | DIASTOLIC BLOOD PRESSURE: 68 MMHG | BODY MASS INDEX: 21.76 KG/M2 | HEART RATE: 78 BPM | TEMPERATURE: 97 F | HEIGHT: 75 IN | RESPIRATION RATE: 18 BRPM | WEIGHT: 175 LBS

## 2023-02-24 DIAGNOSIS — F32.A DEPRESSION, UNSPECIFIED DEPRESSION TYPE: ICD-10-CM

## 2023-02-24 DIAGNOSIS — I48.0 PAROXYSMAL ATRIAL FIBRILLATION (HCC): ICD-10-CM

## 2023-02-24 DIAGNOSIS — I63.9 ACUTE CVA (CEREBROVASCULAR ACCIDENT) (HCC): Primary | ICD-10-CM

## 2023-02-24 DIAGNOSIS — I71.21 ANEURYSM OF ASCENDING AORTA WITHOUT RUPTURE (HCC): ICD-10-CM

## 2023-02-24 DIAGNOSIS — I10 ESSENTIAL HYPERTENSION, BENIGN: ICD-10-CM

## 2023-02-24 DIAGNOSIS — I42.0 DILATED CARDIOMYOPATHY (HCC): ICD-10-CM

## 2023-02-24 DIAGNOSIS — E11.65 TYPE 2 DIABETES MELLITUS WITH HYPERGLYCEMIA, WITHOUT LONG-TERM CURRENT USE OF INSULIN (HCC): ICD-10-CM

## 2023-02-24 PROCEDURE — 99214 OFFICE O/P EST MOD 30 MIN: CPT | Performed by: NURSE PRACTITIONER

## 2023-02-24 PROCEDURE — 3078F DIAST BP <80 MM HG: CPT | Performed by: NURSE PRACTITIONER

## 2023-02-24 PROCEDURE — 3074F SYST BP LT 130 MM HG: CPT | Performed by: NURSE PRACTITIONER

## 2023-02-24 PROCEDURE — 3008F BODY MASS INDEX DOCD: CPT | Performed by: NURSE PRACTITIONER

## 2023-02-27 ENCOUNTER — OFFICE VISIT (OUTPATIENT)
Dept: NEUROLOGY | Facility: CLINIC | Age: 63
End: 2023-02-27
Payer: MEDICAID

## 2023-02-27 VITALS
DIASTOLIC BLOOD PRESSURE: 76 MMHG | WEIGHT: 175 LBS | SYSTOLIC BLOOD PRESSURE: 128 MMHG | BODY MASS INDEX: 22 KG/M2 | RESPIRATION RATE: 16 BRPM | HEART RATE: 74 BPM

## 2023-02-27 DIAGNOSIS — R25.1 TREMOR OBSERVED ON EXAMINATION: ICD-10-CM

## 2023-02-27 DIAGNOSIS — R20.2 NUMBNESS AND TINGLING IN BOTH HANDS: ICD-10-CM

## 2023-02-27 DIAGNOSIS — I63.9 ACUTE CVA (CEREBROVASCULAR ACCIDENT) (HCC): ICD-10-CM

## 2023-02-27 DIAGNOSIS — R20.0 NUMBNESS AND TINGLING IN BOTH HANDS: ICD-10-CM

## 2023-02-27 DIAGNOSIS — G51.0 BELL'S PALSY: Primary | ICD-10-CM

## 2023-02-27 DIAGNOSIS — R20.0 NUMBNESS IN FEET: ICD-10-CM

## 2023-02-27 PROCEDURE — 3078F DIAST BP <80 MM HG: CPT | Performed by: OTHER

## 2023-02-27 PROCEDURE — 99215 OFFICE O/P EST HI 40 MIN: CPT | Performed by: OTHER

## 2023-02-27 PROCEDURE — 3074F SYST BP LT 130 MM HG: CPT | Performed by: OTHER

## 2023-02-27 RX ORDER — GABAPENTIN 100 MG/1
CAPSULE ORAL
Qty: 90 CAPSULE | Refills: 0 | Status: SHIPPED | OUTPATIENT
Start: 2023-02-27

## 2023-02-27 NOTE — PROGRESS NOTES
Patient was seen in the hospital on 02/20/2023. Patient states increase in fatigue. Denies changes in speech and memory. Patient states decrease in balance, patient is dragging the left foot more. Numbness and tingling on the bottom of the feet. Patient states bilateral hand tremors, this started about 5-6 years ago. Bell's palsy was 18 months ago, right side was affected. electronic

## 2023-03-02 ENCOUNTER — APPOINTMENT (OUTPATIENT)
Dept: CARDIOLOGY | Age: 63
End: 2023-03-02
Attending: SPECIALIST

## 2023-03-07 PROBLEM — R94.31 ABNORMAL EKG: Status: ACTIVE | Noted: 2023-03-07

## 2023-03-08 ENCOUNTER — LAB ENCOUNTER (OUTPATIENT)
Dept: LAB | Age: 63
End: 2023-03-08
Attending: Other
Payer: MEDICAID

## 2023-03-08 ENCOUNTER — OFFICE VISIT (OUTPATIENT)
Dept: CARDIOLOGY | Age: 63
End: 2023-03-08

## 2023-03-08 VITALS
SYSTOLIC BLOOD PRESSURE: 130 MMHG | BODY MASS INDEX: 21.93 KG/M2 | HEART RATE: 69 BPM | WEIGHT: 176.37 LBS | HEIGHT: 75 IN | DIASTOLIC BLOOD PRESSURE: 80 MMHG

## 2023-03-08 DIAGNOSIS — Z86.79 HISTORY OF ATRIAL FIBRILLATION: ICD-10-CM

## 2023-03-08 DIAGNOSIS — R94.31 ABNORMAL EKG: ICD-10-CM

## 2023-03-08 DIAGNOSIS — Z86.73 RECENT CEREBROVASCULAR ACCIDENT (CVA): ICD-10-CM

## 2023-03-08 DIAGNOSIS — I25.10 CORONARY ARTERY DISEASE INVOLVING NATIVE CORONARY ARTERY OF NATIVE HEART WITHOUT ANGINA PECTORIS: ICD-10-CM

## 2023-03-08 DIAGNOSIS — I42.6 ALCOHOLIC CARDIOMYOPATHY (CMD): ICD-10-CM

## 2023-03-08 DIAGNOSIS — I10 PRIMARY HYPERTENSION: Primary | ICD-10-CM

## 2023-03-08 DIAGNOSIS — I71.21 ANEURYSM OF ASCENDING AORTA WITHOUT RUPTURE (CMD): ICD-10-CM

## 2023-03-08 DIAGNOSIS — E11.9 NON-INSULIN DEPENDENT TYPE 2 DIABETES MELLITUS (CMD): ICD-10-CM

## 2023-03-08 DIAGNOSIS — R20.0 NUMBNESS IN FEET: ICD-10-CM

## 2023-03-08 DIAGNOSIS — E78.5 DYSLIPIDEMIA: ICD-10-CM

## 2023-03-08 DIAGNOSIS — R20.2 NUMBNESS AND TINGLING IN BOTH HANDS: ICD-10-CM

## 2023-03-08 DIAGNOSIS — R20.0 NUMBNESS AND TINGLING IN BOTH HANDS: ICD-10-CM

## 2023-03-08 LAB — VIT B12 SERPL-MCNC: 378 PG/ML (ref 193–986)

## 2023-03-08 PROCEDURE — 3075F SYST BP GE 130 - 139MM HG: CPT | Performed by: SPECIALIST

## 2023-03-08 PROCEDURE — 99214 OFFICE O/P EST MOD 30 MIN: CPT | Performed by: SPECIALIST

## 2023-03-08 PROCEDURE — 3079F DIAST BP 80-89 MM HG: CPT | Performed by: SPECIALIST

## 2023-03-08 PROCEDURE — 82607 VITAMIN B-12: CPT

## 2023-03-08 PROCEDURE — 36415 COLL VENOUS BLD VENIPUNCTURE: CPT

## 2023-03-08 RX ORDER — RIVAROXABAN 20 MG/1
TABLET, FILM COATED ORAL
COMMUNITY
Start: 2023-02-22 | End: 2023-06-21 | Stop reason: SDUPTHER

## 2023-03-08 RX ORDER — GABAPENTIN 100 MG/1
100 CAPSULE ORAL 2 TIMES DAILY
COMMUNITY
Start: 2023-02-27 | End: 2023-06-21

## 2023-03-08 RX ORDER — ASPIRIN 81 MG/1
81 TABLET, COATED ORAL DAILY
COMMUNITY
Start: 2023-02-22

## 2023-03-08 SDOH — HEALTH STABILITY: PHYSICAL HEALTH: ON AVERAGE, HOW MANY MINUTES DO YOU ENGAGE IN EXERCISE AT THIS LEVEL?: 0 MIN

## 2023-03-08 SDOH — HEALTH STABILITY: PHYSICAL HEALTH: ON AVERAGE, HOW MANY DAYS PER WEEK DO YOU ENGAGE IN MODERATE TO STRENUOUS EXERCISE (LIKE A BRISK WALK)?: 0 DAYS

## 2023-03-08 ASSESSMENT — PATIENT HEALTH QUESTIONNAIRE - PHQ9
SUM OF ALL RESPONSES TO PHQ9 QUESTIONS 1 AND 2: 0
1. LITTLE INTEREST OR PLEASURE IN DOING THINGS: NOT AT ALL
CLINICAL INTERPRETATION OF PHQ2 SCORE: NO FURTHER SCREENING NEEDED
2. FEELING DOWN, DEPRESSED OR HOPELESS: NOT AT ALL
SUM OF ALL RESPONSES TO PHQ9 QUESTIONS 1 AND 2: 0

## 2023-03-13 ENCOUNTER — HOSPITAL ENCOUNTER (OUTPATIENT)
Dept: CARDIOLOGY | Age: 63
Discharge: HOME | End: 2023-03-13
Attending: SPECIALIST

## 2023-03-13 DIAGNOSIS — E78.5 DYSLIPIDEMIA: ICD-10-CM

## 2023-03-13 DIAGNOSIS — I71.21 ANEURYSM OF ASCENDING AORTA WITHOUT RUPTURE (CMD): ICD-10-CM

## 2023-03-13 DIAGNOSIS — I25.10 CORONARY ARTERY DISEASE INVOLVING NATIVE CORONARY ARTERY OF NATIVE HEART WITHOUT ANGINA PECTORIS: ICD-10-CM

## 2023-03-13 DIAGNOSIS — I42.6 ALCOHOLIC CARDIOMYOPATHY (CMD): ICD-10-CM

## 2023-03-13 DIAGNOSIS — E11.9 NON-INSULIN DEPENDENT TYPE 2 DIABETES MELLITUS (CMD): ICD-10-CM

## 2023-03-13 DIAGNOSIS — I10 PRIMARY HYPERTENSION: ICD-10-CM

## 2023-03-13 DIAGNOSIS — Z86.79 HISTORY OF ATRIAL FIBRILLATION: ICD-10-CM

## 2023-03-13 DIAGNOSIS — R94.31 ABNORMAL EKG: ICD-10-CM

## 2023-03-13 DIAGNOSIS — R06.02 SOB (SHORTNESS OF BREATH): ICD-10-CM

## 2023-03-13 LAB — STRESS TARGET HR: 159 BPM

## 2023-03-13 PROCEDURE — G1004 CDSM NDSC: HCPCS | Performed by: INTERNAL MEDICINE

## 2023-03-13 PROCEDURE — 78452 HT MUSCLE IMAGE SPECT MULT: CPT | Performed by: INTERNAL MEDICINE

## 2023-03-13 PROCEDURE — G1004 CDSM NDSC: HCPCS

## 2023-03-13 PROCEDURE — 10002800 HB RX 250 W HCPCS: Performed by: SPECIALIST

## 2023-03-13 PROCEDURE — 93016 CV STRESS TEST SUPVJ ONLY: CPT | Performed by: INTERNAL MEDICINE

## 2023-03-13 PROCEDURE — A9500 TC99M SESTAMIBI: HCPCS | Performed by: SPECIALIST

## 2023-03-13 PROCEDURE — 10006150 HB RX 343: Performed by: SPECIALIST

## 2023-03-13 PROCEDURE — 93017 CV STRESS TEST TRACING ONLY: CPT

## 2023-03-13 PROCEDURE — 78452 HT MUSCLE IMAGE SPECT MULT: CPT

## 2023-03-13 PROCEDURE — 93018 CV STRESS TEST I&R ONLY: CPT | Performed by: INTERNAL MEDICINE

## 2023-03-13 RX ORDER — TETRAKIS(2-METHOXYISOBUTYLISOCYANIDE)COPPER(I) TETRAFLUOROBORATE 1 MG/ML
8 INJECTION, POWDER, LYOPHILIZED, FOR SOLUTION INTRAVENOUS ONCE
Status: COMPLETED | OUTPATIENT
Start: 2023-03-13 | End: 2023-03-13

## 2023-03-13 RX ORDER — REGADENOSON 0.08 MG/ML
0.4 INJECTION, SOLUTION INTRAVENOUS ONCE
Status: COMPLETED | OUTPATIENT
Start: 2023-03-13 | End: 2023-03-13

## 2023-03-13 RX ORDER — TETRAKIS(2-METHOXYISOBUTYLISOCYANIDE)COPPER(I) TETRAFLUOROBORATE 1 MG/ML
24 INJECTION, POWDER, LYOPHILIZED, FOR SOLUTION INTRAVENOUS ONCE
Status: COMPLETED | OUTPATIENT
Start: 2023-03-13 | End: 2023-03-13

## 2023-03-13 RX ADMIN — TETRAKIS(2-METHOXYISOBUTYLISOCYANIDE)COPPER(I) TETRAFLUOROBORATE 28.1 MILLICURIE: 1 INJECTION, POWDER, LYOPHILIZED, FOR SOLUTION INTRAVENOUS at 08:00

## 2023-03-13 RX ADMIN — TETRAKIS(2-METHOXYISOBUTYLISOCYANIDE)COPPER(I) TETRAFLUOROBORATE 8 MILLICURIE: 1 INJECTION, POWDER, LYOPHILIZED, FOR SOLUTION INTRAVENOUS at 06:58

## 2023-03-13 RX ADMIN — REGADENOSON 0.4 MG: 0.08 INJECTION, SOLUTION INTRAVENOUS at 08:00

## 2023-03-14 DIAGNOSIS — F33.1 MODERATE EPISODE OF RECURRENT MAJOR DEPRESSIVE DISORDER (HCC): ICD-10-CM

## 2023-03-16 RX ORDER — BUPROPION HYDROCHLORIDE 200 MG/1
200 TABLET, EXTENDED RELEASE ORAL 2 TIMES DAILY
Qty: 60 TABLET | Refills: 1 | Status: SHIPPED | OUTPATIENT
Start: 2023-03-16

## 2023-03-21 ENCOUNTER — APPOINTMENT (OUTPATIENT)
Dept: CARDIOLOGY | Age: 63
End: 2023-03-21

## 2023-03-23 ENCOUNTER — OFFICE VISIT (OUTPATIENT)
Dept: NEUROLOGY | Facility: CLINIC | Age: 63
End: 2023-03-23
Payer: MEDICAID

## 2023-03-23 VITALS
SYSTOLIC BLOOD PRESSURE: 146 MMHG | WEIGHT: 178.38 LBS | DIASTOLIC BLOOD PRESSURE: 68 MMHG | BODY MASS INDEX: 22 KG/M2 | HEART RATE: 66 BPM | RESPIRATION RATE: 16 BRPM

## 2023-03-23 DIAGNOSIS — I63.9 ACUTE CVA (CEREBROVASCULAR ACCIDENT) (HCC): Primary | ICD-10-CM

## 2023-03-23 PROCEDURE — 3077F SYST BP >= 140 MM HG: CPT

## 2023-03-23 PROCEDURE — 99214 OFFICE O/P EST MOD 30 MIN: CPT

## 2023-03-23 PROCEDURE — 3078F DIAST BP <80 MM HG: CPT

## 2023-03-25 DIAGNOSIS — R20.0 NUMBNESS AND TINGLING IN BOTH HANDS: ICD-10-CM

## 2023-03-25 DIAGNOSIS — R20.2 NUMBNESS AND TINGLING IN BOTH HANDS: ICD-10-CM

## 2023-03-25 DIAGNOSIS — R20.0 NUMBNESS IN FEET: ICD-10-CM

## 2023-03-27 RX ORDER — GABAPENTIN 300 MG/1
CAPSULE ORAL
Qty: 90 CAPSULE | Refills: 0 | Status: SHIPPED | OUTPATIENT
Start: 2023-03-27

## 2023-04-13 ENCOUNTER — APPOINTMENT (OUTPATIENT)
Dept: CARDIOLOGY | Age: 63
End: 2023-04-13

## 2023-04-14 ENCOUNTER — HOSPITAL ENCOUNTER (OUTPATIENT)
Dept: CARDIOLOGY | Age: 63
Discharge: HOME | End: 2023-04-14
Attending: SPECIALIST

## 2023-04-14 DIAGNOSIS — R94.31 ABNORMAL EKG: ICD-10-CM

## 2023-04-14 DIAGNOSIS — I71.21 ANEURYSM OF ASCENDING AORTA WITHOUT RUPTURE (CMD): ICD-10-CM

## 2023-04-14 DIAGNOSIS — Z86.73 RECENT CEREBROVASCULAR ACCIDENT (CVA): ICD-10-CM

## 2023-04-14 DIAGNOSIS — I42.6 ALCOHOLIC CARDIOMYOPATHY (CMD): ICD-10-CM

## 2023-04-14 DIAGNOSIS — I10 PRIMARY HYPERTENSION: ICD-10-CM

## 2023-04-14 DIAGNOSIS — I25.10 CORONARY ARTERY DISEASE INVOLVING NATIVE CORONARY ARTERY OF NATIVE HEART WITHOUT ANGINA PECTORIS: ICD-10-CM

## 2023-04-14 DIAGNOSIS — Z86.79 HISTORY OF ATRIAL FIBRILLATION: ICD-10-CM

## 2023-04-14 DIAGNOSIS — E78.5 DYSLIPIDEMIA: ICD-10-CM

## 2023-04-14 DIAGNOSIS — E11.9 NON-INSULIN DEPENDENT TYPE 2 DIABETES MELLITUS (CMD): ICD-10-CM

## 2023-04-17 ENCOUNTER — EXTERNAL LAB (OUTPATIENT)
Dept: OTHER | Age: 63
End: 2023-04-17

## 2023-04-17 ENCOUNTER — LAB ENCOUNTER (OUTPATIENT)
Dept: LAB | Age: 63
End: 2023-04-17
Attending: INTERNAL MEDICINE
Payer: MEDICAID

## 2023-04-17 DIAGNOSIS — E11.65 TYPE 2 DIABETES MELLITUS WITH HYPERGLYCEMIA, WITH LONG-TERM CURRENT USE OF INSULIN (HCC): ICD-10-CM

## 2023-04-17 DIAGNOSIS — Z13.228 SCREENING FOR METABOLIC DISORDER: ICD-10-CM

## 2023-04-17 DIAGNOSIS — Z13.0 SCREENING FOR DISORDER OF BLOOD AND BLOOD-FORMING ORGANS: ICD-10-CM

## 2023-04-17 DIAGNOSIS — E11.9 TYPE 2 DIABETES MELLITUS WITHOUT COMPLICATION, WITHOUT LONG-TERM CURRENT USE OF INSULIN (HCC): ICD-10-CM

## 2023-04-17 DIAGNOSIS — Z13.220 SCREENING FOR LIPID DISORDERS: ICD-10-CM

## 2023-04-17 DIAGNOSIS — Z79.4 TYPE 2 DIABETES MELLITUS WITH HYPERGLYCEMIA, WITH LONG-TERM CURRENT USE OF INSULIN (HCC): ICD-10-CM

## 2023-04-17 DIAGNOSIS — Z12.5 SCREENING FOR MALIGNANT NEOPLASM OF PROSTATE: ICD-10-CM

## 2023-04-17 DIAGNOSIS — Z13.29 SCREENING FOR THYROID DISORDER: ICD-10-CM

## 2023-04-17 DIAGNOSIS — Z00.00 ROUTINE GENERAL MEDICAL EXAMINATION AT A HEALTH CARE FACILITY: ICD-10-CM

## 2023-04-17 LAB
ABSOLUTE IMMATURE GRANULOCYTES (OFFPRE24): 0.02 X10(3)UL (ref 0–1)
ALBUMIN SERPL-MCNC: 3.4 G/DL (ref 3.4–5)
ALBUMIN SERPL-MCNC: 3.4 G/DL (ref 3.4–5)
ALBUMIN/GLOB SERPL: 0.9 {RATIO} (ref 1–2)
ALBUMIN/GLOB SERPL: 0.9 {RATIO} (ref 1–2)
ALP LIVER SERPL-CCNC: 107 U/L
ALP SERPL-CCNC: 107 U/L (ref 45–117)
ALT SERPL-CCNC: 23 U/L
ALT SERPL-CCNC: 23 U/L (ref 16–61)
ANION GAP SERPL CALC-SCNC: 1 MMOL/L (ref 0–18)
ANION GAP SERPL CALC-SCNC: 1 MMOL/L (ref 0–18)
AST SERPL-CCNC: 16 U/L (ref 15–37)
AST SERPL-CCNC: 16 U/L (ref 15–37)
BASOPHILS # BLD AUTO: 0.04 X10(3) UL (ref 0–0.2)
BASOPHILS # BLD: 0.04 X10(3)UL (ref 0–0.2)
BASOPHILS NFR BLD AUTO: 0.5 %
BASOPHILS NFR BLD: 0.5 %
BILIRUB SERPL-MCNC: 1 MG/DL (ref 0.1–2)
BILIRUB SERPL-MCNC: 1 MG/DL (ref 0.1–2)
BUN BLD-MCNC: 18 MG/DL (ref 7–18)
BUN SERPL-MCNC: 18 MG/DL (ref 7–18)
CALCIUM BLD-MCNC: 8.8 MG/DL (ref 8.5–10.1)
CALCIUM SERPL-MCNC: 8.8 MG/DL (ref 8.5–10.1)
CALCULATED OSMO: 289 MOSM/KG (ref 275–295)
CHLORIDE SERPL-SCNC: 104 MMOL/L (ref 98–112)
CHLORIDE SERPL-SCNC: 104 MMOL/L (ref 98–112)
CHOLEST SERPL-MCNC: 79 MG/DL
CHOLEST SERPL-MCNC: 79 MG/DL (ref ?–200)
CO2 SERPL-SCNC: 32 MMOL/L (ref 21–32)
CO2 SERPL-SCNC: 32 MMOL/L (ref 21–32)
COMPLEXED PSA SERPL-MCNC: 1.66 NG/ML (ref ?–4)
CREAT BLD-MCNC: 1.13 MG/DL
CREAT SERPL-MCNC: 1.13 MG/DL (ref 0.7–1.3)
CREAT UR-MCNC: 72 MG/DL
CREAT UR-SCNC: 72 MG/DL
EOSINOPHIL # BLD AUTO: 0.25 X10(3) UL (ref 0–0.7)
EOSINOPHIL # BLD: 0.25 X10(3)UL (ref 0–0.7)
EOSINOPHIL NFR BLD AUTO: 3.1 %
EOSINOPHIL NFR BLD: 3.1 %
ERYTHROCYTE [DISTWIDTH] IN BLOOD BY AUTOMATED COUNT: 11.9 %
ERYTHROCYTE [DISTWIDTH] IN BLOOD: 11.9 %
EST. AVERAGE GLUCOSE BLD GHB EST-MCNC: 140 MG/DL (ref 68–126)
EST. AVERAGE GLUCOSE BLD GHB EST-MCNC: 140 MG/DL (ref 68–126)
FASTING PATIENT LIPID ANSWER: YES
FASTING STATUS PATIENT QL REPORTED: YES
GFR SERPLBLD BASED ON 1.73 SQ M-ARVRAT: 74 ML/MIN/1.73M2 (ref 60–?)
GFR SERPLBLD SCHWARTZ-ARVRAT: 74 ML/MIN/1.73M2
GLOBULIN PLAS-MCNC: 3.8 G/DL (ref 2.8–4.4)
GLOBULIN SER-MCNC: 3.8 G/DL (ref 2.8–4.4)
GLUCOSE BLD-MCNC: 160 MG/DL (ref 70–99)
GLUCOSE SERPL-MCNC: 160 MG/DL (ref 70–99)
HBA1C MFR BLD: 6.5 %
HBA1C MFR BLD: 6.5 % (ref ?–5.7)
HCT VFR BLD AUTO: 51.6 %
HCT VFR BLD CALC: 51.6 % (ref 39–53)
HDLC SERPL-MCNC: 35 MG/DL (ref 40–59)
HDLC SERPL-MCNC: 35 MG/DL (ref 40–59)
HGB BLD-MCNC: 17.4 G/DL
HGB BLD-MCNC: 17.4 G/DL (ref 13–17.5)
IMM GRANULOCYTES # BLD AUTO: 0.02 X10(3) UL (ref 0–1)
IMM GRANULOCYTES NFR BLD: 0.2 %
IMMATURE GRANULOCYTES (OFFPRE25): 0.2 %
LDLC SERPL CALC-MCNC: 29 MG/DL
LDLC SERPL CALC-MCNC: 29 MG/DL (ref ?–100)
LENGTH OF FAST TIME PATIENT: YES H
LENGTH OF FAST TIME PATIENT: YES H
LYMPHOCYTES # BLD AUTO: 1.84 X10(3) UL (ref 1–4)
LYMPHOCYTES # BLD: 1.84 X10(3)UL (ref 1–4)
LYMPHOCYTES NFR BLD AUTO: 22.8 %
LYMPHOCYTES NFR BLD: 22.8 %
MCH RBC QN AUTO: 29.9 PG (ref 26–34)
MCH RBC QN AUTO: 29.9 PG (ref 26–34)
MCHC RBC AUTO-ENTMCNC: 33.7 G/DL (ref 31–37)
MCHC RBC AUTO-ENTMCNC: 33.7 G/DL (ref 31–37)
MCV RBC AUTO: 88.7 FL
MCV RBC AUTO: 88.7 FL (ref 80–100)
MICROALBUMIN UR-MCNC: 7.76 MG/DL
MICROALBUMIN UR-MCNC: 7.76 MG/DL
MICROALBUMIN/CREAT 24H UR-RTO: 107.8 UG/MG (ref ?–30)
MICROALBUMIN/CREAT UR: 107.8 UG/MG
MONOCYTES # BLD AUTO: 0.74 X10(3) UL (ref 0.1–1)
MONOCYTES # BLD: 0.74 X10(3)UL (ref 0.1–1)
MONOCYTES NFR BLD AUTO: 9.2 %
MONOCYTES NFR BLD: 9.2 %
NEUTROPHILS # BLD AUTO: 5.19 X10 (3) UL (ref 1.5–7.7)
NEUTROPHILS # BLD AUTO: 5.19 X10(3) UL (ref 1.5–7.7)
NEUTROPHILS # BLD: 5.19 X10(3)UL (ref 1.5–7.7)
NEUTROPHILS NFR BLD AUTO: 64.2 %
NEUTROPHILS NFR BLD: 64.2 %
NONHDLC SERPL-MCNC: 44 MG/DL
NONHDLC SERPL-MCNC: 44 MG/DL (ref ?–130)
OSMOLALITY SERPL CALC.SUM OF ELEC: 289 MOSM/KG (ref 275–295)
PLATELET # BLD AUTO: 209 10(3)UL (ref 150–450)
PLATELET # BLD: 209 10(3)UL (ref 150–450)
POTASSIUM SERPL-SCNC: 3.4 MMOL/L (ref 3.5–5.1)
POTASSIUM SERPL-SCNC: 3.4 MMOL/L (ref 3.5–5.1)
PROT SERPL-MCNC: 7.2 G/DL (ref 6.4–8.2)
PROT SERPL-MCNC: 7.2 G/DL (ref 6.4–8.2)
PSA SERPL-MCNC: 1.66 NG/ML
RBC # BLD AUTO: 5.82 X10(6)UL
RBC # BLD: 5.82 X10(6)UL (ref 4.3–5.7)
SODIUM SERPL-SCNC: 137 MMOL/L (ref 136–145)
SODIUM SERPL-SCNC: 137 MMOL/L (ref 136–145)
TRIGL SERPL-MCNC: 65 MG/DL (ref 30–149)
TRIGL SERPL-MCNC: 65 MG/DL (ref 30–149)
TSH SERPL-ACNC: 1.19 MIU/ML (ref 0.36–3.74)
TSI SER-ACNC: 1.19 MIU/ML (ref 0.36–3.74)
VLDLC SERPL CALC-MCNC: 9 MG/DL (ref 0–30)
VLDLC SERPL CALC-MCNC: 9 MG/DL (ref 0–30)
WBC # BLD AUTO: 8.1 X10(3) UL (ref 4–11)
WBC # BLD: 8.1 X10(3)UL (ref 4–11)

## 2023-04-17 PROCEDURE — 84443 ASSAY THYROID STIM HORMONE: CPT

## 2023-04-17 PROCEDURE — 3060F POS MICROALBUMINURIA REV: CPT | Performed by: INTERNAL MEDICINE

## 2023-04-17 PROCEDURE — 85025 COMPLETE CBC W/AUTO DIFF WBC: CPT

## 2023-04-17 PROCEDURE — 3061F NEG MICROALBUMINURIA REV: CPT | Performed by: INTERNAL MEDICINE

## 2023-04-17 PROCEDURE — 80053 COMPREHEN METABOLIC PANEL: CPT

## 2023-04-17 PROCEDURE — 80061 LIPID PANEL: CPT

## 2023-04-17 PROCEDURE — 82570 ASSAY OF URINE CREATININE: CPT

## 2023-04-17 PROCEDURE — 83036 HEMOGLOBIN GLYCOSYLATED A1C: CPT

## 2023-04-17 PROCEDURE — 82043 UR ALBUMIN QUANTITATIVE: CPT

## 2023-04-17 PROCEDURE — 36415 COLL VENOUS BLD VENIPUNCTURE: CPT

## 2023-04-17 PROCEDURE — 3044F HG A1C LEVEL LT 7.0%: CPT | Performed by: INTERNAL MEDICINE

## 2023-04-24 ENCOUNTER — HOSPITAL ENCOUNTER (OUTPATIENT)
Dept: CT IMAGING | Age: 63
End: 2023-04-24
Attending: SPECIALIST

## 2023-04-24 ENCOUNTER — TELEPHONE (OUTPATIENT)
Dept: CARDIOLOGY | Age: 63
End: 2023-04-24

## 2023-04-25 ENCOUNTER — OFFICE VISIT (OUTPATIENT)
Dept: INTERNAL MEDICINE CLINIC | Facility: CLINIC | Age: 63
End: 2023-04-25
Payer: MEDICAID

## 2023-04-25 VITALS
TEMPERATURE: 96 F | HEART RATE: 70 BPM | WEIGHT: 171.38 LBS | DIASTOLIC BLOOD PRESSURE: 72 MMHG | RESPIRATION RATE: 18 BRPM | OXYGEN SATURATION: 99 % | SYSTOLIC BLOOD PRESSURE: 110 MMHG | HEIGHT: 73 IN | BODY MASS INDEX: 22.71 KG/M2

## 2023-04-25 DIAGNOSIS — I25.10 CORONARY ARTERY DISEASE INVOLVING NATIVE HEART WITHOUT ANGINA PECTORIS, UNSPECIFIED VESSEL OR LESION TYPE: ICD-10-CM

## 2023-04-25 DIAGNOSIS — J30.2 SEASONAL ALLERGIES: ICD-10-CM

## 2023-04-25 DIAGNOSIS — E78.00 PURE HYPERCHOLESTEROLEMIA: ICD-10-CM

## 2023-04-25 DIAGNOSIS — L98.9 SKIN LESION: ICD-10-CM

## 2023-04-25 DIAGNOSIS — G51.0 BELL'S PALSY: ICD-10-CM

## 2023-04-25 DIAGNOSIS — E11.65 TYPE 2 DIABETES MELLITUS WITH HYPERGLYCEMIA, WITHOUT LONG-TERM CURRENT USE OF INSULIN (HCC): ICD-10-CM

## 2023-04-25 DIAGNOSIS — Z12.11 COLON CANCER SCREENING: ICD-10-CM

## 2023-04-25 DIAGNOSIS — F41.9 ANXIETY: ICD-10-CM

## 2023-04-25 DIAGNOSIS — I48.0 PAROXYSMAL ATRIAL FIBRILLATION (HCC): ICD-10-CM

## 2023-04-25 DIAGNOSIS — F33.9 RECURRENT MAJOR DEPRESSIVE DISORDER, REMISSION STATUS UNSPECIFIED (HCC): ICD-10-CM

## 2023-04-25 DIAGNOSIS — J45.20 MILD INTERMITTENT ASTHMA WITHOUT COMPLICATION: ICD-10-CM

## 2023-04-25 DIAGNOSIS — Z86.73 HISTORY OF STROKE IN PRIOR 3 MONTHS: ICD-10-CM

## 2023-04-25 DIAGNOSIS — I42.0 DILATED CARDIOMYOPATHY (HCC): ICD-10-CM

## 2023-04-25 DIAGNOSIS — I71.21 ANEURYSM OF ASCENDING AORTA WITHOUT RUPTURE (HCC): ICD-10-CM

## 2023-04-25 DIAGNOSIS — I10 PRIMARY HYPERTENSION: ICD-10-CM

## 2023-04-25 DIAGNOSIS — Z00.00 WELLNESS EXAMINATION: Primary | ICD-10-CM

## 2023-04-25 PROBLEM — F32.A DEPRESSION: Status: RESOLVED | Noted: 2018-05-15 | Resolved: 2023-04-25

## 2023-04-25 PROBLEM — I63.9 ACUTE CVA (CEREBROVASCULAR ACCIDENT) (HCC): Status: RESOLVED | Noted: 2023-02-20 | Resolved: 2023-04-25

## 2023-04-25 PROCEDURE — 3078F DIAST BP <80 MM HG: CPT | Performed by: INTERNAL MEDICINE

## 2023-04-25 PROCEDURE — 99396 PREV VISIT EST AGE 40-64: CPT | Performed by: INTERNAL MEDICINE

## 2023-04-25 PROCEDURE — 3008F BODY MASS INDEX DOCD: CPT | Performed by: INTERNAL MEDICINE

## 2023-04-25 PROCEDURE — 3074F SYST BP LT 130 MM HG: CPT | Performed by: INTERNAL MEDICINE

## 2023-04-25 RX ORDER — ALBUTEROL SULFATE 90 UG/1
1 AEROSOL, METERED RESPIRATORY (INHALATION) EVERY 6 HOURS PRN
Qty: 1 EACH | Refills: 0 | Status: SHIPPED | OUTPATIENT
Start: 2023-04-25

## 2023-04-25 RX ORDER — CETIRIZINE HYDROCHLORIDE 5 MG/1
5 TABLET ORAL DAILY
Qty: 60 TABLET | Refills: 0 | Status: SHIPPED | OUTPATIENT
Start: 2023-04-25

## 2023-05-09 ENCOUNTER — TELEPHONE (OUTPATIENT)
Dept: CARDIOLOGY | Age: 63
End: 2023-05-09

## 2023-05-12 DIAGNOSIS — F33.1 MODERATE EPISODE OF RECURRENT MAJOR DEPRESSIVE DISORDER (HCC): ICD-10-CM

## 2023-05-12 RX ORDER — BUPROPION HYDROCHLORIDE 200 MG/1
TABLET, EXTENDED RELEASE ORAL
Qty: 180 TABLET | Refills: 0 | Status: SHIPPED | OUTPATIENT
Start: 2023-05-12

## 2023-05-12 NOTE — TELEPHONE ENCOUNTER
Last OV 04/23/2025  Last PE 04/25/23  Last REFILL Bupropion 3/16/23  Last LABS 4/17/23    Future Appointments   Date Time Provider Tonia Maldonado   5/22/2023  7:30 AM ADAM Vaca [facility] [place]   5/26/2023 10:15 AM Cristiano Booker, DO [facility] [place]         Per PROTOCOL?     Please Advise

## 2023-05-16 ENCOUNTER — TELEPHONE (OUTPATIENT)
Dept: CARDIOLOGY | Age: 63
End: 2023-05-16

## 2023-05-22 ENCOUNTER — OFFICE VISIT (OUTPATIENT)
Dept: ENDOCRINOLOGY CLINIC | Facility: CLINIC | Age: 63
End: 2023-05-22
Payer: MEDICAID

## 2023-05-22 VITALS
HEIGHT: 73 IN | RESPIRATION RATE: 20 BRPM | OXYGEN SATURATION: 100 % | DIASTOLIC BLOOD PRESSURE: 75 MMHG | BODY MASS INDEX: 23.19 KG/M2 | HEART RATE: 66 BPM | WEIGHT: 175 LBS | SYSTOLIC BLOOD PRESSURE: 129 MMHG

## 2023-05-22 DIAGNOSIS — E11.65 TYPE 2 DIABETES MELLITUS WITH HYPERGLYCEMIA, WITH LONG-TERM CURRENT USE OF INSULIN (HCC): Primary | ICD-10-CM

## 2023-05-22 DIAGNOSIS — E78.5 DYSLIPIDEMIA: ICD-10-CM

## 2023-05-22 DIAGNOSIS — I10 ESSENTIAL HYPERTENSION, BENIGN: ICD-10-CM

## 2023-05-22 DIAGNOSIS — Z79.4 TYPE 2 DIABETES MELLITUS WITH HYPERGLYCEMIA, WITH LONG-TERM CURRENT USE OF INSULIN (HCC): Primary | ICD-10-CM

## 2023-05-22 DIAGNOSIS — I50.9 CONGESTIVE HEART FAILURE, UNSPECIFIED HF CHRONICITY, UNSPECIFIED HEART FAILURE TYPE (HCC): ICD-10-CM

## 2023-05-22 RX ORDER — BUPROPION HYDROCHLORIDE 200 MG/1
200 TABLET, EXTENDED RELEASE ORAL 2 TIMES DAILY
Qty: 180 TABLET | Refills: 1 | Status: SHIPPED | OUTPATIENT
Start: 2023-05-22

## 2023-05-22 RX ORDER — DULAGLUTIDE 0.75 MG/.5ML
0.75 INJECTION, SOLUTION SUBCUTANEOUS WEEKLY
Qty: 2 ML | Refills: 2 | Status: SHIPPED | OUTPATIENT
Start: 2023-05-22

## 2023-05-22 RX ORDER — LISINOPRIL AND HYDROCHLOROTHIAZIDE 20; 12.5 MG/1; MG/1
1 TABLET ORAL DAILY
Qty: 90 TABLET | Refills: 1 | Status: SHIPPED | OUTPATIENT
Start: 2023-05-22

## 2023-05-26 ENCOUNTER — OFFICE VISIT (OUTPATIENT)
Dept: NEUROLOGY | Facility: CLINIC | Age: 63
End: 2023-05-26
Payer: MEDICAID

## 2023-05-26 VITALS
DIASTOLIC BLOOD PRESSURE: 62 MMHG | WEIGHT: 173.63 LBS | SYSTOLIC BLOOD PRESSURE: 118 MMHG | RESPIRATION RATE: 16 BRPM | BODY MASS INDEX: 23 KG/M2 | HEART RATE: 64 BPM

## 2023-05-26 DIAGNOSIS — G62.9 SMALL FIBER NEUROPATHY: ICD-10-CM

## 2023-05-26 DIAGNOSIS — I63.9 CARDIOEMBOLIC STROKE (HCC): ICD-10-CM

## 2023-05-26 DIAGNOSIS — G25.0 ESSENTIAL TREMOR: ICD-10-CM

## 2023-05-26 DIAGNOSIS — E11.42 DIABETIC POLYNEUROPATHY ASSOCIATED WITH TYPE 2 DIABETES MELLITUS (HCC): Primary | ICD-10-CM

## 2023-05-26 PROCEDURE — 3074F SYST BP LT 130 MM HG: CPT | Performed by: OTHER

## 2023-05-26 PROCEDURE — 3078F DIAST BP <80 MM HG: CPT | Performed by: OTHER

## 2023-05-26 PROCEDURE — 99214 OFFICE O/P EST MOD 30 MIN: CPT | Performed by: OTHER

## 2023-05-26 RX ORDER — GABAPENTIN 300 MG/1
CAPSULE ORAL
Qty: 180 CAPSULE | Refills: 2 | Status: SHIPPED | OUTPATIENT
Start: 2023-05-26

## 2023-05-26 RX ORDER — PROCHLORPERAZINE 25 MG/1
SUPPOSITORY RECTAL
COMMUNITY
Start: 2023-04-26

## 2023-05-26 NOTE — PROGRESS NOTES
Patient states no stroke like symptoms. Patient states neuropathy in BLE, patient states he is having mostly lack of sensation in the BLE. Patient states cold sensation in the hands.

## 2023-06-05 ENCOUNTER — PATIENT MESSAGE (OUTPATIENT)
Dept: NEUROLOGY | Facility: CLINIC | Age: 63
End: 2023-06-05

## 2023-06-05 RX ORDER — ATORVASTATIN CALCIUM 80 MG/1
TABLET, FILM COATED ORAL
Qty: 30 TABLET | Refills: 2 | OUTPATIENT
Start: 2023-06-05

## 2023-06-07 RX ORDER — ATORVASTATIN CALCIUM 80 MG/1
80 TABLET, FILM COATED ORAL NIGHTLY
Qty: 30 TABLET | Refills: 2 | Status: CANCELLED | OUTPATIENT
Start: 2023-06-07

## 2023-06-07 NOTE — TELEPHONE ENCOUNTER
From: Angi Bran, DO  To: Austin Nix  Sent: 6/5/2023 5:07 PM CDT  Subject: statin    . Michael Fallon,    I usually recommend that PCP's refill chronic doses of statins, but I can refill the lipitor if you answer a couple questions for me. Were you on any statin prior to your stroke this year? Are you tolerating this dose?     Dr. Palmira Avila

## 2023-06-09 NOTE — TELEPHONE ENCOUNTER
Cholesterol Medication Protocol Passed 06/07/2023 09:33 AM   Protocol Details  ALT < 80    ALT resulted within past year    Lipid panel within past 12 months    Appointment within past 12 or next 3 months     6/7/23 30 with 3

## 2023-06-12 ENCOUNTER — PATIENT MESSAGE (OUTPATIENT)
Dept: ENDOCRINOLOGY CLINIC | Facility: CLINIC | Age: 63
End: 2023-06-12

## 2023-06-12 DIAGNOSIS — Z79.4 TYPE 2 DIABETES MELLITUS WITH HYPERGLYCEMIA, WITH LONG-TERM CURRENT USE OF INSULIN (HCC): Primary | ICD-10-CM

## 2023-06-12 DIAGNOSIS — E11.65 TYPE 2 DIABETES MELLITUS WITH HYPERGLYCEMIA, WITH LONG-TERM CURRENT USE OF INSULIN (HCC): Primary | ICD-10-CM

## 2023-06-12 PROCEDURE — 95251 CONT GLUC MNTR ANALYSIS I&R: CPT | Performed by: NURSE PRACTITIONER

## 2023-06-12 NOTE — TELEPHONE ENCOUNTER
-----------------------------  Dexcom Clarity  -----------------------------  Audratimothy Andrews  Date of Birth: [id number]  Generated at:  Risa, Jun 12, 2023 1:09 PM CDT  Reporting period: Tue May 30, 2023 - Mon Jun 12, 2023  -----------------------------  Glucose Details  Average glucose: 173 mg/dL  Standard deviation: 32 mg/dL  GMI: 7.4%  -----------------------------  Time in Range  Very High: <1%  High: 40%  In Range: 59%  Low: 0%  Very Low: 0%    Target Range   mg/dL    -----------------------------  CGM Details  Sensor usage: 93%  Days with CGM data: 13/14

## 2023-06-21 ENCOUNTER — OFFICE VISIT (OUTPATIENT)
Dept: CARDIOLOGY | Age: 63
End: 2023-06-21

## 2023-06-21 VITALS
SYSTOLIC BLOOD PRESSURE: 119 MMHG | HEART RATE: 69 BPM | BODY MASS INDEX: 20.83 KG/M2 | WEIGHT: 167.55 LBS | DIASTOLIC BLOOD PRESSURE: 76 MMHG | HEIGHT: 75 IN

## 2023-06-21 DIAGNOSIS — J30.2 SEASONAL ALLERGIES: ICD-10-CM

## 2023-06-21 DIAGNOSIS — E78.5 DYSLIPIDEMIA: ICD-10-CM

## 2023-06-21 DIAGNOSIS — I71.21 ANEURYSM OF ASCENDING AORTA WITHOUT RUPTURE (CMD): ICD-10-CM

## 2023-06-21 DIAGNOSIS — I42.6 ALCOHOLIC CARDIOMYOPATHY (CMD): Primary | ICD-10-CM

## 2023-06-21 DIAGNOSIS — Z86.73 RECENT CEREBROVASCULAR ACCIDENT (CVA): ICD-10-CM

## 2023-06-21 DIAGNOSIS — E11.9 NON-INSULIN DEPENDENT TYPE 2 DIABETES MELLITUS (CMD): ICD-10-CM

## 2023-06-21 DIAGNOSIS — I10 PRIMARY HYPERTENSION: ICD-10-CM

## 2023-06-21 DIAGNOSIS — I25.10 CORONARY ARTERY DISEASE INVOLVING NATIVE CORONARY ARTERY OF NATIVE HEART WITHOUT ANGINA PECTORIS: ICD-10-CM

## 2023-06-21 DIAGNOSIS — Z86.79 HISTORY OF ATRIAL FIBRILLATION: ICD-10-CM

## 2023-06-21 PROCEDURE — 99214 OFFICE O/P EST MOD 30 MIN: CPT | Performed by: SPECIALIST

## 2023-06-21 PROCEDURE — 3078F DIAST BP <80 MM HG: CPT | Performed by: SPECIALIST

## 2023-06-21 PROCEDURE — 3074F SYST BP LT 130 MM HG: CPT | Performed by: SPECIALIST

## 2023-06-21 RX ORDER — CETIRIZINE HYDROCHLORIDE 5 MG/1
TABLET ORAL
COMMUNITY
Start: 2023-05-21

## 2023-06-21 RX ORDER — CARVEDILOL 25 MG/1
25 TABLET ORAL 2 TIMES DAILY WITH MEALS
Qty: 180 TABLET | Refills: 3 | Status: SHIPPED | OUTPATIENT
Start: 2023-06-21

## 2023-06-21 RX ORDER — ATORVASTATIN CALCIUM 40 MG/1
40 TABLET, FILM COATED ORAL DAILY
Qty: 90 TABLET | Refills: 3 | Status: SHIPPED | OUTPATIENT
Start: 2023-06-21

## 2023-06-21 RX ORDER — ATORVASTATIN CALCIUM 40 MG/1
40 TABLET, FILM COATED ORAL
COMMUNITY
Start: 2023-06-07 | End: 2023-06-21 | Stop reason: SDUPTHER

## 2023-06-21 RX ORDER — AMLODIPINE BESYLATE 2.5 MG/1
2.5 TABLET ORAL DAILY
Qty: 90 TABLET | Refills: 3 | Status: SHIPPED | OUTPATIENT
Start: 2023-06-21

## 2023-06-21 RX ORDER — GABAPENTIN 300 MG/1
CAPSULE ORAL
COMMUNITY
Start: 2023-03-27

## 2023-06-21 RX ORDER — LISINOPRIL AND HYDROCHLOROTHIAZIDE 20; 12.5 MG/1; MG/1
1 TABLET ORAL DAILY
Qty: 90 TABLET | Refills: 3 | Status: SHIPPED | OUTPATIENT
Start: 2023-06-21

## 2023-06-21 RX ORDER — DULAGLUTIDE 0.75 MG/.5ML
INJECTION, SOLUTION SUBCUTANEOUS
COMMUNITY
Start: 2023-05-22

## 2023-06-21 RX ORDER — CETIRIZINE HYDROCHLORIDE 5 MG/1
TABLET ORAL
Qty: 90 TABLET | Refills: 0 | Status: SHIPPED | OUTPATIENT
Start: 2023-06-21 | End: 2023-06-22

## 2023-06-21 RX ORDER — ALBUTEROL SULFATE 90 UG/1
AEROSOL, METERED RESPIRATORY (INHALATION)
COMMUNITY
Start: 2023-04-25

## 2023-06-21 RX ORDER — RIVAROXABAN 20 MG/1
20 TABLET, FILM COATED ORAL
Qty: 90 TABLET | Refills: 3 | Status: SHIPPED | OUTPATIENT
Start: 2023-06-21

## 2023-06-21 RX ORDER — CETIRIZINE HYDROCHLORIDE 5 MG/1
5 TABLET ORAL DAILY
Qty: 90 TABLET | Refills: 0 | Status: CANCELLED | OUTPATIENT
Start: 2023-06-21

## 2023-06-21 SDOH — HEALTH STABILITY: PHYSICAL HEALTH: ON AVERAGE, HOW MANY DAYS PER WEEK DO YOU ENGAGE IN MODERATE TO STRENUOUS EXERCISE (LIKE A BRISK WALK)?: 6 DAYS

## 2023-06-21 SDOH — HEALTH STABILITY: PHYSICAL HEALTH: ON AVERAGE, HOW MANY MINUTES DO YOU ENGAGE IN EXERCISE AT THIS LEVEL?: 60 MIN

## 2023-06-21 ASSESSMENT — PATIENT HEALTH QUESTIONNAIRE - PHQ9
SUM OF ALL RESPONSES TO PHQ9 QUESTIONS 1 AND 2: 0
1. LITTLE INTEREST OR PLEASURE IN DOING THINGS: NOT AT ALL
SUM OF ALL RESPONSES TO PHQ9 QUESTIONS 1 AND 2: 0
CLINICAL INTERPRETATION OF PHQ2 SCORE: NO FURTHER SCREENING NEEDED
2. FEELING DOWN, DEPRESSED OR HOPELESS: NOT AT ALL

## 2023-06-22 RX ORDER — CETIRIZINE HYDROCHLORIDE 10 MG/1
10 TABLET ORAL DAILY
Qty: 30 TABLET | Refills: 0 | Status: SHIPPED | OUTPATIENT
Start: 2023-06-22

## 2023-07-11 DIAGNOSIS — E11.9 TYPE 2 DIABETES MELLITUS WITHOUT COMPLICATION, WITHOUT LONG-TERM CURRENT USE OF INSULIN (HCC): ICD-10-CM

## 2023-07-11 RX ORDER — METFORMIN HYDROCHLORIDE 500 MG/1
500 TABLET, EXTENDED RELEASE ORAL 2 TIMES DAILY WITH MEALS
Qty: 180 TABLET | Refills: 1 | Status: SHIPPED | OUTPATIENT
Start: 2023-07-11

## 2023-07-11 NOTE — TELEPHONE ENCOUNTER
Requested Prescriptions     Pending Prescriptions Disp Refills    METFORMIN  MG Oral Tablet 24 Hr [Pharmacy Med Name: METFORMIN ER 500MG 24HR TABS] 180 tablet 1     Sig: TAKE 1 TABLET(500 MG) BY MOUTH TWICE DAILY WITH MEALS     Your appointments       Date & Time Appointment Department [facility])    Sep 25, 2023  8:15 AM CDT [place] Diabetic Follow Up with ADAM [place], [place] ( [facility])        Nov 30, 2023  8:35 AM CST Follow Up Visit with Robley Goldmann, Reba Salt, [place] ([address] [facility])              Arnol Manzo [place]   [place] [address] [facility] [address] Dimas Gunter [address] [address] [phone]          Last A1c value was 6.5% done 4/17/2023.     Refill 1/16/23  LOV 5/22/23

## 2023-08-03 RX ORDER — CETIRIZINE HYDROCHLORIDE 10 MG/1
10 TABLET ORAL DAILY
Qty: 90 TABLET | Refills: 0 | Status: SHIPPED | OUTPATIENT
Start: 2023-08-03

## 2023-08-03 NOTE — TELEPHONE ENCOUNTER
Requested Prescriptions     Pending Prescriptions Disp Refills    CETIRIZINE 10 MG Oral Tab [Pharmacy Med Name: CETIRIZINE 10MG TABLETS] 30 tablet 0     Sig: TAKE 1 TABLET BY MOUTH EVERY DAY       LOV: 4/25/23 MP    LAST PHYSICAL: 4/25/23 MP    LAST REFILL: [id number]/79    LAST LAB: 4/17/23    Your Appointments      Monday September 25, 2023  8:15 AM  [place] Diabetic Follow Up with Rogelia Krabbe, APRN  [facility], [address] [place] [address] ([facility] [place] [facility] the  [facility]) [facility] 71        Thursday November 30, 2023  8:40 AM  Exam - Established with Keenan Montague MD  [address], [address] [place], [place] [address]Corbin Kim [facility]. [address] [facility] [facility] [id number] [phone]

## 2023-08-17 RX ORDER — DULAGLUTIDE 0.75 MG/.5ML
0.75 INJECTION, SOLUTION SUBCUTANEOUS WEEKLY
Qty: 2 ML | Refills: 2 | Status: SHIPPED | OUTPATIENT
Start: 2023-08-17

## 2023-08-17 NOTE — TELEPHONE ENCOUNTER
Requested Prescriptions     Pending Prescriptions Disp Refills    TRULICITY 2.48 XR/4.6SX Subcutaneous Solution Pen-injector [Pharmacy Med Name: JBOMHCADP 9.74WF/2.9DB SDP 0.5ML] 2 mL 2     Sig: ADMINISTER 0.75 MG UNDER THE SKIN 1 TIME A WEEK     Your appointments       Date & Time Appointment Department [facility])    Sep 25, 2023  8:15 AM CDT [place]/Md Diabetic Follow Up with Sara Martinez, [address], [address] [place] ([facility])        Nov 30, 2023  8:40 AM CST Exam - Established with Meg Ruelas MD [address], [facility], [address] [place] [address] ([facility])              [employer], [address]  [facility] [place] [address] [facility] [address]  [facility], [address] [place] [address], [address] [place] [address]  [address] [facility] [address] [phone]          Last A1c value was 6.5% done 4/17/2023.     Refill 5/22/23  LOV 5/22/23

## 2023-08-17 NOTE — TELEPHONE ENCOUNTER
Last visit wellness with MP 4/2023    9. Recurrent major depressive disorder, remission status unspecified (HCC)  Continue bupropion. Looking for therapist in network.      10. Anxiety

## 2023-08-18 RX ORDER — BUPROPION HYDROCHLORIDE 200 MG/1
200 TABLET, EXTENDED RELEASE ORAL 2 TIMES DAILY
Qty: 180 TABLET | Refills: 1 | Status: SHIPPED | OUTPATIENT
Start: 2023-08-18

## 2023-08-21 RX ORDER — BUPROPION HYDROCHLORIDE 200 MG/1
200 TABLET, EXTENDED RELEASE ORAL 2 TIMES DAILY
Qty: 180 TABLET | Refills: 1 | OUTPATIENT
Start: 2023-08-21

## 2023-09-08 ENCOUNTER — PATIENT MESSAGE (OUTPATIENT)
Dept: ENDOCRINOLOGY CLINIC | Facility: CLINIC | Age: 63
End: 2023-09-08

## 2023-09-08 NOTE — TELEPHONE ENCOUNTER
POC A1c% can be done in office during appointment, patient does not need to go to lab. [facility] sent to patient.     Franky Parker APRN, BC-ADM, [facility]

## 2023-09-08 NOTE — TELEPHONE ENCOUNTER
Pt messaged asking if he needs labs done before appt on 9/25. Looks like all his labs were done in April, A1c would be the only thing due.  Nothing is ordered so just wanted to confirm

## 2023-09-08 NOTE — TELEPHONE ENCOUNTER
From: Joselyn Matthew  To: ADAM Adkins  Sent: 9/8/2023 5:22 AM CDT  Subject: Blood Test     Henry Tirado I have an appointment to see you on September 25. Do you want me to take a blood test before the appointment?    Thank you,  Comfort Frederick

## 2023-09-25 ENCOUNTER — OFFICE VISIT (OUTPATIENT)
Dept: ENDOCRINOLOGY CLINIC | Facility: CLINIC | Age: 63
End: 2023-09-25
Payer: MEDICAID

## 2023-09-25 VITALS
HEART RATE: 82 BPM | OXYGEN SATURATION: 98 % | RESPIRATION RATE: 16 BRPM | SYSTOLIC BLOOD PRESSURE: 120 MMHG | BODY MASS INDEX: 22 KG/M2 | WEIGHT: 169.63 LBS | DIASTOLIC BLOOD PRESSURE: 62 MMHG

## 2023-09-25 DIAGNOSIS — Z79.4 TYPE 2 DIABETES MELLITUS WITH HYPERGLYCEMIA, WITH LONG-TERM CURRENT USE OF INSULIN (HCC): ICD-10-CM

## 2023-09-25 DIAGNOSIS — E11.9 TYPE 2 DIABETES MELLITUS WITHOUT COMPLICATION, WITHOUT LONG-TERM CURRENT USE OF INSULIN (HCC): ICD-10-CM

## 2023-09-25 DIAGNOSIS — E11.65 TYPE 2 DIABETES MELLITUS WITH HYPERGLYCEMIA, WITH LONG-TERM CURRENT USE OF INSULIN (HCC): ICD-10-CM

## 2023-09-25 DIAGNOSIS — I50.9 CONGESTIVE HEART FAILURE, UNSPECIFIED HF CHRONICITY, UNSPECIFIED HEART FAILURE TYPE (HCC): ICD-10-CM

## 2023-09-25 DIAGNOSIS — I10 ESSENTIAL HYPERTENSION, BENIGN: ICD-10-CM

## 2023-09-25 DIAGNOSIS — E78.5 DYSLIPIDEMIA: ICD-10-CM

## 2023-09-25 LAB
CARTRIDGE LOT#: 612 NUMERIC
HEMOGLOBIN A1C: 6.3 % (ref 4.3–5.6)

## 2023-09-25 RX ORDER — DULAGLUTIDE 0.75 MG/.5ML
0.75 INJECTION, SOLUTION SUBCUTANEOUS WEEKLY
Qty: 2 ML | Refills: 3 | Status: SHIPPED | OUTPATIENT
Start: 2023-09-25

## 2023-09-25 RX ORDER — INSULIN LISPRO 100 [IU]/ML
INJECTION, SOLUTION INTRAVENOUS; SUBCUTANEOUS
Qty: 3 ML | Refills: 0 | Status: SHIPPED | OUTPATIENT
Start: 2023-09-25

## 2023-09-25 RX ORDER — ACYCLOVIR 400 MG/1
1 TABLET ORAL
Qty: 3 EACH | Refills: 3 | Status: SHIPPED | OUTPATIENT
Start: 2023-09-25

## 2023-09-25 RX ORDER — METFORMIN HYDROCHLORIDE 500 MG/1
500 TABLET, EXTENDED RELEASE ORAL 2 TIMES DAILY WITH MEALS
Qty: 180 TABLET | Refills: 1 | Status: SHIPPED | OUTPATIENT
Start: 2023-09-25

## 2023-10-23 RX ORDER — INSULIN LISPRO 100 [IU]/ML
INJECTION, SOLUTION INTRAVENOUS; SUBCUTANEOUS
Qty: 15 ML | Refills: 0 | Status: SHIPPED | OUTPATIENT
Start: 2023-10-23

## 2023-10-23 NOTE — TELEPHONE ENCOUNTER
Requested Prescriptions     Pending Prescriptions Disp Refills    INSULIN LISPRO, 1 UNIT DIAL, 100 UNIT/ML Subcutaneous Solution Pen-injector [Pharmacy Med Name: INSULIN LISPRO 100U/ML KWIKPEN 3ML] 3 mL 0     Sig: USE UP TO 10 UNITS DAILY AS DIRECTED BASED ON SLIDING SCALE     Future Appointments   Date Time Provider Tonia Maldonado   11/30/2023  8:40 AM Fabricio Crow MD [facility]   1/22/2024  7:30 AM ADAM Garay [facility]     Last A1c value was 6.3% done 9/25/2023.   Refill 9/25/23    LOV 9/25/23

## 2023-11-20 DIAGNOSIS — J30.2 SEASONAL ALLERGIES: Primary | ICD-10-CM

## 2023-11-21 RX ORDER — CETIRIZINE HYDROCHLORIDE 10 MG/1
10 TABLET ORAL DAILY
Qty: 90 TABLET | Refills: 0 | Status: SHIPPED | OUTPATIENT
Start: 2023-11-21

## 2023-11-30 ENCOUNTER — OFFICE VISIT (OUTPATIENT)
Dept: NEUROLOGY | Facility: CLINIC | Age: 63
End: 2023-11-30
Payer: MEDICAID

## 2023-11-30 VITALS
BODY MASS INDEX: 22.4 KG/M2 | WEIGHT: 169 LBS | SYSTOLIC BLOOD PRESSURE: 114 MMHG | RESPIRATION RATE: 16 BRPM | DIASTOLIC BLOOD PRESSURE: 66 MMHG | HEIGHT: 73 IN | HEART RATE: 71 BPM

## 2023-11-30 DIAGNOSIS — I63.9 CARDIOEMBOLIC STROKE (HCC): ICD-10-CM

## 2023-11-30 DIAGNOSIS — R20.8 DECREASED VIBRATORY SENSE: ICD-10-CM

## 2023-11-30 DIAGNOSIS — R20.0 NUMBNESS AND TINGLING IN BOTH HANDS: ICD-10-CM

## 2023-11-30 DIAGNOSIS — R20.0 NUMBNESS IN FEET: Primary | ICD-10-CM

## 2023-11-30 DIAGNOSIS — R20.2 NUMBNESS AND TINGLING IN BOTH HANDS: ICD-10-CM

## 2023-11-30 DIAGNOSIS — R25.1 TREMOR OBSERVED ON EXAMINATION: ICD-10-CM

## 2023-11-30 PROCEDURE — 3078F DIAST BP <80 MM HG: CPT | Performed by: OTHER

## 2023-11-30 PROCEDURE — 99215 OFFICE O/P EST HI 40 MIN: CPT | Performed by: OTHER

## 2023-11-30 PROCEDURE — 3008F BODY MASS INDEX DOCD: CPT | Performed by: OTHER

## 2023-11-30 PROCEDURE — 3074F SYST BP LT 130 MM HG: CPT | Performed by: OTHER

## 2023-11-30 RX ORDER — GABAPENTIN 300 MG/1
CAPSULE ORAL
Qty: 270 CAPSULE | Refills: 2 | Status: SHIPPED | OUTPATIENT
Start: 2023-11-30

## 2023-12-13 ENCOUNTER — APPOINTMENT (OUTPATIENT)
Dept: CARDIOLOGY | Age: 63
End: 2023-12-13

## 2024-02-01 ENCOUNTER — TELEPHONE (OUTPATIENT)
Dept: ENDOCRINOLOGY CLINIC | Facility: CLINIC | Age: 64
End: 2024-02-01

## 2024-02-01 NOTE — TELEPHONE ENCOUNTER
Pt no showed on 01/22/2024, will send message to schedule.  Okay to complete PA or wait until he has scheduled?    Received PA request for Dexcom G7 sensor.     KEY: [id number]    LOV: 09/2023  Future Appointments   Date Time Provider Department Center   2/29/2024  8:40 AM Alonso Wilhelm MD [facility] [place]     Last A1c value was 6.3% done 9/25/2023.

## 2024-02-03 NOTE — TELEPHONE ENCOUNTER
PA will request last A1C and chart note   Last A1c value was 6.3% done 9/25/2023.    Overdue for A1C and appt  If my chart message not read, please call pt -

## 2024-02-14 DIAGNOSIS — R20.2 NUMBNESS AND TINGLING IN BOTH HANDS: ICD-10-CM

## 2024-02-14 DIAGNOSIS — R20.0 NUMBNESS AND TINGLING IN BOTH HANDS: ICD-10-CM

## 2024-02-14 DIAGNOSIS — R20.0 NUMBNESS IN FEET: ICD-10-CM

## 2024-02-15 RX ORDER — GABAPENTIN 300 MG/1
CAPSULE ORAL
Qty: 180 CAPSULE | Refills: 0 | OUTPATIENT
Start: 2024-02-15

## 2024-02-15 NOTE — TELEPHONE ENCOUNTER
Medication: Gabapentin 300 mg     Date of last refill: 11/30/2023 with 2 addt refills  [place]      Last office visit: 11/30/2023  Due back to clinic per last office note:    Date next office visit scheduled:    Future Appointments   Date Time Provider Department Center   2/29/2024  8:40 AM Alonso Wilhelm MD [facility] [place]           Last OV note recommendation:    Impression/ Plan:  Filippo Asher is a 63 year old male who was previously recommended to see neurology for evaluation of tremor over the past 5-6 yrs.        Otherwise, he had bilateral frontal acute / subacute infarct suggestive of embolic infarcts and continue ASA 81 mg daily as well as Xarelto.       Exam with mild slowing of fine motor movements in finger taps and +tremor left > right with FNF but no dystmetria and no resting tremor; no rigidity; sensory reduced to vibration in both LE at great toes and 7-8 sec at malleoli bilaterally; proprioception mildly impaired with greater excursions needed R more than L; pin reduced up to calf bilaterally; tremor is mild and improved on gabapentin at 600 mg nightly.     Overall, likely neuropathy - could be due to DM type 2 but has borderline B12 in the past and will check other etiologies and NCS/EMG as some findings to suggest large and small fiber involvement     In addition, will increase gabapentin to 300 mg AM / 600 mg nightly as he has tightness up to calf during day     1. Numbness in feet  As noted above  - Vitamin B12; Future  - Vitamin B1 (Thiamine), Blood; Future  - Vitamin B6; Future  - gabapentin 300 MG Oral Cap; Take 300 mg AM ( 1 capsule) and 600 mg (2 capsules) by mouth at night  Dispense: 270 capsule; Refill: 2     2. Decreased vibratory sense  As noted above  - Vitamin B12; Future  - Vitamin B1 (Thiamine), Blood; Future  - Vitamin B6; Future     3. Numbness and tingling in both hands  As noted above  - Vitamin B12; Future  - Vitamin B1  (Thiamine), Blood; Future  - Vitamin B6; Future  - gabapentin 300 MG Oral Cap; Take 300 mg AM ( 1 capsule) and 600 mg (2 capsules) by mouth at night  Dispense: 270 capsule; Refill: 2     4. Tremor observed on examination  As noted above   - Vitamin B12; Future  - Vitamin B1 (Thiamine), Blood; Future  - Vitamin B6; Future     5. Cardioembolic stroke (HCC)  As noted above     Return in about 3 months (around 2/29/2024).     40 total minutes spent, including chart review, discussion of pertinent labs and imaging with patient with discussion of differential diagnosis and plan of care as noted above; patient allowed to ask questions and all questions answered to the best of my ability      Alonso Wilhelm MD, Neurology  [facility]  Pager [phone]  11/30/2023

## 2024-02-15 NOTE — TELEPHONE ENCOUNTER
Medication was increased at last office visit to 1 capsule in the AM and 2 capsules in the PM.    Will refuse this refill as it is incorrect.

## 2024-02-20 ENCOUNTER — TELEPHONE (OUTPATIENT)
Dept: INTERNAL MEDICINE CLINIC | Facility: CLINIC | Age: 64
End: 2024-02-20

## 2024-02-20 NOTE — TELEPHONE ENCOUNTER
Patient states that he has had this issue for 1 year and intermittent.  Pt states when he stands for a long time his back and neck begin to hurt, he becomes dizzy and nauseated.  Pt sits down and the issue almost immediately resolves.  Pt states this happens about 3-4 times a week.  Pt scheduled an appt with MP for 3/1/24.  Tried to offer earlier appt however he needed early morning, no appts available.  Pt given ER warnings.  Pt verbalizes understanding.  FYI to MP.

## 2024-02-26 ENCOUNTER — OFFICE VISIT (OUTPATIENT)
Dept: INTERNAL MEDICINE CLINIC | Facility: CLINIC | Age: 64
End: 2024-02-26
Payer: MEDICAID

## 2024-02-26 VITALS
DIASTOLIC BLOOD PRESSURE: 70 MMHG | SYSTOLIC BLOOD PRESSURE: 118 MMHG | HEIGHT: 73 IN | TEMPERATURE: 97 F | HEART RATE: 71 BPM | OXYGEN SATURATION: 97 % | WEIGHT: 177 LBS | RESPIRATION RATE: 18 BRPM | BODY MASS INDEX: 23.46 KG/M2

## 2024-02-26 DIAGNOSIS — M54.2 NECK PAIN: Primary | ICD-10-CM

## 2024-02-26 DIAGNOSIS — S46.812A STRAIN OF LEFT TRAPEZIUS MUSCLE, INITIAL ENCOUNTER: ICD-10-CM

## 2024-02-26 DIAGNOSIS — S46.811A STRAIN OF RIGHT TRAPEZIUS MUSCLE, INITIAL ENCOUNTER: ICD-10-CM

## 2024-02-26 DIAGNOSIS — R11.0 NAUSEA: ICD-10-CM

## 2024-02-26 PROCEDURE — 99213 OFFICE O/P EST LOW 20 MIN: CPT | Performed by: INTERNAL MEDICINE

## 2024-02-26 NOTE — PROGRESS NOTES
Filipop Asher  3/7/1960    Chief Complaint   Patient presents with    Neck Pain     C/o Neck pain and dizziness.      SUBJECTIVE   Filippo Asher is a 63 year old male who presents for eval of neck pain x 1 year.    Pain located in posterior neck when standing up for a long period of time, such as when doing dishes. Pain described as an ache and sometimes radiates upward into base of skull. Sometimes triggers epigastric abdominal pain and nausea, no emesis.    Review of Systems   Review of Systems   No f/c/chest pain or sob. No cough. No abd pain/n/v/d. No ha or dizziness. No numbness, tingling, or weakness. No other complaints today.    OBJECTIVE:   /70   Pulse 71   Temp 96.9 °F (36.1 °C) (Temporal)   Resp 18   Ht 6' 1\" (1.854 m)   Wt 177 lb (80.3 kg)   SpO2 97%   BMI 23.35 kg/m²   Physical Exam   Constitutional: Oriented to person, place, and time. No distress.   HEENT:  Normocephalic and atraumatic.TM's wnl.  Nose normal. Oropharynx is clear and moist.   Eyes: Conjunctivae wnl.   Neck: Normal range of motion. Neck supple.   Cardiovascular: Normal rate, regular rhythm and intact distal pulses.  No murmur, rubs or gallops. No carotid bruits.  Pulmonary/Chest: Effort normal and breath sounds normal. No respiratory distress.  Musculoskeletal: Bilateral trapezius muscles are very tight. Full ROM cervical spine.  Lymphadenopathy: No cervical adenopathy.   Neurological: No focal neurologic deficits. Negative Spurling test.    Lab Results   Component Value Date     (H) 04/17/2023    BUN 18 04/17/2023    CREATSERUM 1.13 04/17/2023    BUNCREA 10.2 07/21/2020    ANIONGAP 1 04/17/2023    GFRAA 71 07/21/2020    GFRNAA 61 07/21/2020    CA 8.8 04/17/2023     04/17/2023    K 3.4 (L) 04/17/2023     04/17/2023    CO2 32.0 04/17/2023    OSMOCALC 289 04/17/2023      Lab Results   Component Value Date    WBC 8.1 04/17/2023    RBC 5.82 (H) 04/17/2023    HGB 17.4 04/17/2023    HCT 51.6 04/17/2023     MCV 88.7 04/17/2023    MCH 29.9 04/17/2023    MCHC 33.7 04/17/2023    RDW 11.9 04/17/2023    .0 04/17/2023      Lab Results   Component Value Date    TSH 1.190 04/17/2023      ASSESSMENT AND PLAN:       ICD-10-CM    1. Neck pain  M54.2 XR CERVICAL SPINE (4VIEWS) (CPT=72050)     Physical Therapy Referral - [facility]      2. Strain of left trapezius muscle, initial encounter  S46.812A Physical Therapy Referral - [facility]      3. Strain of right trapezius muscle, initial encounter  S46.811A Physical Therapy Referral - [facility]      4. Nausea  R11.0       Etiology of neck pain uncertain. Exacerbated by prolonged standing, likely with neck flexion. May be 2/2 posture? Associated abdominal pain and nausea may be a vasovagal response? Improves w/ sitting down and resting/extending neck.    Instructed to try Lidocaine patch and heat to C spine musculature. Will check radiograph of C spine given remote history of trauma to neck.      The patient indicates understanding of these issues and agrees to the plan.  The patient is asked to return or present to the emergency room for worsening of symptoms.    TODAY'S ORDERS     No orders of the defined types were placed in this encounter.      Meds & Refills:  Requested Prescriptions      No prescriptions requested or ordered in this encounter       Imaging & Consults:  OP REFERRAL TO [facility]  XR CERVICAL SPINE (4VIEWS) (CPT=72050)    No follow-ups on file.  There are no Patient Instructions on file for this visit.    All questions were answered and the patient agrees with the plan.     Thank you,  Agustín Ulrich, DO

## 2024-03-02 ENCOUNTER — HOSPITAL ENCOUNTER (OUTPATIENT)
Dept: GENERAL RADIOLOGY | Facility: HOSPITAL | Age: 64
Discharge: HOME | End: 2024-03-02
Attending: INTERNAL MEDICINE
Payer: MEDICAID

## 2024-03-02 DIAGNOSIS — M54.2 NECK PAIN: ICD-10-CM

## 2024-03-02 PROCEDURE — 72050 X-RAY EXAM NECK SPINE 4/5VWS: CPT | Performed by: INTERNAL MEDICINE

## 2024-03-27 ENCOUNTER — OFFICE VISIT (OUTPATIENT)
Dept: PHYSICAL THERAPY | Age: 64
End: 2024-03-27
Attending: INTERNAL MEDICINE
Payer: MEDICAID

## 2024-03-27 DIAGNOSIS — S46.811A STRAIN OF RIGHT TRAPEZIUS MUSCLE, INITIAL ENCOUNTER: ICD-10-CM

## 2024-03-27 DIAGNOSIS — S46.812A STRAIN OF LEFT TRAPEZIUS MUSCLE, INITIAL ENCOUNTER: ICD-10-CM

## 2024-03-27 DIAGNOSIS — M54.2 NECK PAIN: Primary | ICD-10-CM

## 2024-03-27 PROCEDURE — 97161 PT EVAL LOW COMPLEX 20 MIN: CPT

## 2024-03-27 PROCEDURE — 97110 THERAPEUTIC EXERCISES: CPT

## 2024-03-27 NOTE — PROGRESS NOTES
SPINE EVALUATION:     Diagnosis:    Neck pain (M54.2)  Strain of left trapezius muscle, initial encounter (S46.812A)  Strain of right trapezius muscle, initial encounter (S46.811A)      Referring Provider: Agustín Ulrich  Date of Evaluation:    3/27/2024    Precautions:  None Next MD visit:   none scheduled  Date of Surgery: n/a     PATIENT SUMMARY   Filippo Asher is a R dominant 64 year old male who presents to therapy today with complaints of R and L neck pain for ~ 1 year. No known injury. Pain is at the posterior neck and can radiate to the occiput region. Can progress to paraspinals to thoracic and lumbar spine. Symptoms produced with prolonged standing > 5 minutes, sitting and driving. Works for medical   and delivery of supplies. Has been a  for ~ 1 year. Uses  bag and estimates to be 20-30 lbs. Pt at new job as  for ~ 1 year. Recently tried Lidocaine patch and heat with no significant relief. Recent x ray unremarkable per report in chart. Denies radiating pain, N/T, weakness.     .     Pt describes pain level current /10, at best 0/10, at worst 8/10.   Current functional limitations include difficulty with standing > 5 minutes, sitting when reading, driving.     Filippo describes prior level of function as no previous limitations due to neck/back pain. His current job requires increased physical activity than previous job.  No recreational activities or exercise. Rest when not working. Lives in apartment.     Pt goals include improve and resolve pain.    Past medical history was reviewed with Filippo. Significant findings include CVA, DM 2, anxiety and depression.   Pt denies diplopia, dysarthria, dysphasia, dizziness, drop attacks, bowel/bladder changes, saddle anesthesia, and NINA LE N/T.    ASSESSMENT  Filippo is a R dominant 64 year old male who presents to therapy today with complaints of R and L neck pain for ~ 1 year. No known injury. Pain is at the posterior  neck and can radiate to the occiput region. Can progress to paraspinals to thoracic and lumbar spine. Symptoms produced with prolonged standing > 5 minutes. Pain with sitting and driving. Works as medical  , and delivery of supplies. Has been a  for ~ 1 year. Uses  bag and estimates to be 20-30 lbs. Recently tried Lidocaine patch and heat with no significant relief. Denies radiating pain, N/T, weakness.     The results of the objective tests and measures show no neck pain prior to assessment or during assessment.  Pt demonstrates loss of cervical spine ROM and strength, decreased cervical spine muscle length, decreased C/T junction and upper cervical spine mobility, poor posture awareness. Negative UE neural tension tests and cervical instability testing. Functional deficits include but are not limited to difficulty with standing > 5 minutes, sitting when reading, driving.     Signs and symptoms are consistent with diagnosis of cervical spine dysfunction/posture dysfunction. Pt and PT discussed evaluation findings, pathology, POC and HEP.  Pt voiced understanding and performs HEP correctly without reported pain. Skilled Physical Therapy is medically necessary to address the above impairments and reach functional goals.     OBJECTIVE:   Observation/Posture: slouch/flexed posture in sitting, standing, walking. FHP with forward and IR humerus, protracted scapulae, no scoliosis.  Neuro Screen: negative    Cervical spine AROM: Flexion: full  Extension: 30  Sidebending: R 15; L 15  Rotation: R 45; L 45  Upper cervical rotation R 45. L 20    Accessory motion: decreased at upper cervical and C/T junction  Palpation: negative    Strength:   UE/Scapular    UE myotomes at shoulder elbow and wrist symmetrical 4/5.    Decreased cranial and cervical flexors     Flexibility:   UE/Scapular   Decreased pec and latissimus muscle length.  Decreased upper trapezius muscle length.       Special tests:   Full  motion of median nerve ULTT. Negative upper cervical stability tests.     Today’s Treatment and Response:   Pt education was provided on exam findings, treatment diagnosis, treatment plan, expectations, and prognosis. Tx was posture correction to decrease upper cervical extension, FHP posture, flexed trunk, improve sitting and standing posture. Manual PT with grade 3,4  mobes for upper cervical L rotation followed by upper cervical PROM and AROM.  Pt performed HEP cranial flexion in supine with chin nod, progress to 10 sec holds x 6 reps followed by thoracic extension AROM with overpressure from UE's x 10 reps 2 set. HO issued for HEP.       Charges: PT Eval Low Complexity, TE 2      Total Timed Treatment: 20 min     Total Treatment Time: 45 min       PLAN OF CARE:    Goals: ( 8 visits) Mar approved 8 sessions  -Improve cranial flexor strength to WNL,  no upper cervical extension sitting, neck pain reduced 2/10 or < with sitting and driving.   -Improve cervical rotation to 60* L and R so pt can turn head w/o limitations, operate car safely.   -Improve cervical extension to 60* or > so pt can look up into overhead shelves and cabinets w/o restrictions.  -improve upper quarter muscle length to WFL's to reduce flexed posture, neck and back pain reduced to 2/10 or less with daily activities.   -independent with progressive HEP    Frequency / Duration: Patient will be seen for 1-2 x/week or a total of 8 visits over a 90 day period. Treatment will include: Manual Therapy, Neuromuscular Re-education, Therapeutic Exercise, and Home Exercise Program instruction    Education or treatment limitation: None  Rehab Potential:good    Neck Disability Index Score  Score: 14 % (3/20/2024  8:57 AM)      Patient/Family/Caregiver was advised of these findings, precautions, and treatment options and has agreed to actively participate in planning and for this course of care.    Thank you for your referral. If you have any questions,  please contact me at Dept: [phone]    Sincerely,  Electronically signed by therapist: Jens Hale, PT

## 2024-03-29 DIAGNOSIS — E11.9 TYPE 2 DIABETES MELLITUS WITHOUT COMPLICATION, WITHOUT LONG-TERM CURRENT USE OF INSULIN (HCC): ICD-10-CM

## 2024-03-29 RX ORDER — METFORMIN HYDROCHLORIDE 500 MG/1
500 TABLET, EXTENDED RELEASE ORAL 2 TIMES DAILY WITH MEALS
Qty: 180 TABLET | Refills: 1 | Status: SHIPPED | OUTPATIENT
Start: 2024-03-29

## 2024-03-29 RX ORDER — DULAGLUTIDE 0.75 MG/.5ML
0.75 INJECTION, SOLUTION SUBCUTANEOUS WEEKLY
Qty: 2 ML | Refills: 3 | Status: SHIPPED | OUTPATIENT
Start: 2024-03-29

## 2024-03-29 NOTE — TELEPHONE ENCOUNTER
Requested Prescriptions     Pending Prescriptions Disp Refills    METFORMIN  MG Oral Tablet 24 Hr [Pharmacy Med Name: METFORMIN ER 500MG 24HR TABS] 180 tablet 1     Sig: TAKE 1 TABLET(500 MG) BY MOUTH TWICE DAILY WITH MEALS     Your appointments       Date & Time Appointment Department ([place])    Apr 03, 2024  7:30 AM CDT Follow Up Visit with Radha Hanson APRN [facility] ([facility])    Contact your primary care provider if your insurance requires a referral.    Please arrive 15 minutes prior to your scheduled appointment. Be sure to bring your current Insurance card, Photo ID, and medication bottles or a list of your current medications.      A 24 hour notice is required to cancel any appointment or you may be charged a $40 No Show Fee.     Important: 24 hour notice is required to cancel any appointment or you may be charged a $40 No Show Fee. Please notify your physician office.               [facility]  [address]  [facility] [id number] [phone]          Last A1c value was 6.3% done 9/25/2023.    Refill 9/25/23  LOV 9/25/23

## 2024-03-29 NOTE — TELEPHONE ENCOUNTER
Referring Physician: Domo Brooke MD     Date of Onset: 7/24/19    Diagnosis: Cervical spine pain (M54.2)  Nonintractable episodic headache, unspecified headache type (R51)  Cervical spinal stenosis (M48.02)  Precautions: h/o cyclical vomitting, Requested Prescriptions     Pending Prescriptions Disp Refills    TRULICITY 0.75 MG/0.5ML Subcutaneous Solution Pen-injector [Pharmacy Med Name: TRULICITY 0.75MG/0.5ML SDP 0.5ML] 2 mL 3     Sig: ADMINISTER 0.75 MG UNDER THE SKIN 1 TIME A WEEK     Your appointments       Date & Time Appointment Department ([place])    Apr 03, 2024  7:30 AM CDT Follow Up Visit with Radha Hanson APRN [facility] ([facility])    Contact your primary care provider if your insurance requires a referral.    Please arrive 15 minutes prior to your scheduled appointment. Be sure to bring your current Insurance card, Photo ID, and medication bottles or a list of your current medications.      A 24 hour notice is required to cancel any appointment or you may be charged a $40 No Show Fee.     Important: 24 hour notice is required to cancel any appointment or you may be charged a $40 No Show Fee. Please notify your physician office.               [facility]  [address]  [facility] [address]  [phone]          Last A1c value was 6.3% done 9/25/2023.    Refill 9/25/23  LOV 9/25/23   -Manual gentle cervical traction supine 3 x 10 secs  -STM to both suboccipitals, right SCM, upper traps and levator scap stretches. -ME to right lateral cervical flexion.     Manual therapy:  Passive neck lateral flexion stretches 3 x 10 secs  -OA release Plan: continue HEP and ROM, and manual therapy, cervical/thoracic stretching and strengthening exercises to improved functional mobility.    Charges: TE x 1, MM  x2   Total Timed Treatment: 45 min   Total Treatment Time: 47 min

## 2024-04-01 ENCOUNTER — APPOINTMENT (OUTPATIENT)
Dept: PHYSICAL THERAPY | Age: 64
End: 2024-04-01
Attending: INTERNAL MEDICINE
Payer: MEDICAID

## 2024-04-03 ENCOUNTER — APPOINTMENT (OUTPATIENT)
Dept: PHYSICAL THERAPY | Age: 64
End: 2024-04-03
Attending: INTERNAL MEDICINE
Payer: MEDICAID

## 2024-04-08 ENCOUNTER — APPOINTMENT (OUTPATIENT)
Dept: PHYSICAL THERAPY | Age: 64
End: 2024-04-08
Attending: INTERNAL MEDICINE
Payer: MEDICAID

## 2024-04-10 ENCOUNTER — APPOINTMENT (OUTPATIENT)
Dept: PHYSICAL THERAPY | Age: 64
End: 2024-04-10
Attending: INTERNAL MEDICINE
Payer: MEDICAID

## 2024-04-15 ENCOUNTER — APPOINTMENT (OUTPATIENT)
Dept: PHYSICAL THERAPY | Age: 64
End: 2024-04-15
Attending: INTERNAL MEDICINE
Payer: MEDICAID

## 2024-04-17 ENCOUNTER — APPOINTMENT (OUTPATIENT)
Dept: PHYSICAL THERAPY | Age: 64
End: 2024-04-17
Attending: INTERNAL MEDICINE
Payer: MEDICAID

## 2024-04-22 ENCOUNTER — APPOINTMENT (OUTPATIENT)
Dept: PHYSICAL THERAPY | Age: 64
End: 2024-04-22
Attending: INTERNAL MEDICINE
Payer: MEDICAID

## 2024-06-20 ENCOUNTER — TELEPHONE (OUTPATIENT)
Dept: INTERNAL MEDICINE CLINIC | Facility: CLINIC | Age: 64
End: 2024-06-20

## 2024-06-21 ENCOUNTER — TELEPHONE (OUTPATIENT)
Dept: INTERNAL MEDICINE CLINIC | Facility: CLINIC | Age: 64
End: 2024-06-21

## 2024-06-24 NOTE — TELEPHONE ENCOUNTER
I called the  home for additional information, , etc. He was found down at home. To the best of my ability I filled out the death note. It was returned to MA.

## (undated) NOTE — ED AVS SNAPSHOT
Manuel Martinez   MRN: [id number]    Department:  [facility] Emergency Department   Date of Visit:  5/6/2018           Disclosure     Insurance plans vary and the physician(s) referred by the ER may not be covered by your plan.  Please contact you tell this physician (or your personal doctor if your instructions are to return to your personal doctor) about any new or lasting problems. The primary care or specialist physician will see patients referred from the [facility] Emergency Department.  Tung Cho

## (undated) NOTE — LETTER
03/04/21    Marcelo Mckoy  [address]  3/7/1960    Mr [place]en[place] . At [place] [address], patient care is our priority. To best serve our patients, our mission is to be a partner in your healthcare.   Just as you have e

## (undated) NOTE — LETTER
Your patient was recently seen at the [facility] for a hospital follow-up visit. The visit note is attached. Please contact the clinic with any questions at [phone].     Thank you,  ADAM Pina

## (undated) NOTE — LETTER
01/09/19    [facility] and [employer] [address] [employer]  Attn: Carmelita [address]   [address] [place] [address]  Fax [phone]    To Whom It May Concern,    I am requesting an appeal for the denial of J

## (undated) NOTE — LETTER
ASTHMA ACTION PLAN for Felicia Carvajal     : 3/7/1960     Date: 2018  Provider:  Rell Up NP  Phone for doctor or clinic: [facility] [facility], [address] [facility], [address] [place] [address] [phone]

## (undated) NOTE — LETTER
6/5/2020    Vina Kocher  [address]    Dear Randy Alpers    My office staff has made several attempts to contact you at my request.  It is in my best judgment for your health and well-being that you contact my office to schedule

## (undated) NOTE — LETTER
ASTHMA ACTION PLAN for Jonathan Mata     : 3/7/1960     Date: 2019  Provider:  Rani Barrett MD  Phone for doctor or clinic: [facility], [address] [address] [facility], [facility]. Lindsay Nick [address] [place] [address], [id number] [place] [address]  [phone]

## (undated) NOTE — LETTER
Patient Name: Filippo Asher  YOB: 1960          MRN number:  [id number]  Date:  3/28/2024  Referring Physician:  Agustín Ulrich         SPINE EVALUATION:     Diagnosis:    Neck pain (M54.2)  Strain of left trapezius muscle, initial encounter (L76.225Q)  Strain of right trapezius muscle, initial encounter (U65.008D)      Referring Provider: Agustín Ulrich  Date of Evaluation:    3/27/2024    Precautions:  None Next MD visit:   none scheduled  Date of Surgery: n/a     PATIENT SUMMARY   Filippo Asher is a R dominant 64 year old male who presents to therapy today with complaints of R and L neck pain for ~ 1 year. No known injury. Pain is at the posterior neck and can radiate to the occiput region. Can progress to paraspinals to thoracic and lumbar spine. Symptoms produced with prolonged standing > 5 minutes, sitting and driving. Works for medical   and delivery of supplies. Has been a  for ~ 1 year. Uses  bag and estimates to be 20-30 lbs. Pt at new job as  for ~ 1 year. Recently tried Lidocaine patch and heat with no significant relief. Recent x ray unremarkable per report in chart. Denies radiating pain, N/T, weakness.     .     Pt describes pain level current /10, at best 0/10, at worst 8/10.   Current functional limitations include difficulty with standing > 5 minutes, sitting when reading, driving.     Filippo describes prior level of function as no previous limitations due to neck/back pain. His current job requires increased physical activity than previous job.  No recreational activities or exercise. Rest when not working. Lives in apartment.     Pt goals include improve and resolve pain.    Past medical history was reviewed with Filippo. Significant findings include CVA, DM 2, anxiety and depression.   Pt denies diplopia, dysarthria, dysphasia, dizziness, drop attacks, bowel/bladder changes, saddle anesthesia, and NINA LE N/T.    ASSESSMENT  Filippo  is a R dominant 64 year old male who presents to therapy today with complaints of R and L neck pain for ~ 1 year. No known injury. Pain is at the posterior neck and can radiate to the occiput region. Can progress to paraspinals to thoracic and lumbar spine. Symptoms produced with prolonged standing > 5 minutes. Pain with sitting and driving. Works as medical  , and delivery of supplies. Has been a  for ~ 1 year. Uses  bag and estimates to be 20-30 lbs. Recently tried Lidocaine patch and heat with no significant relief. Denies radiating pain, N/T, weakness.     The results of the objective tests and measures show no neck pain prior to assessment or during assessment.  Pt demonstrates loss of cervical spine ROM and strength, decreased cervical spine muscle length, decreased C/T junction and upper cervical spine mobility, poor posture awareness. Negative UE neural tension tests and cervical instability testing. Functional deficits include but are not limited to difficulty with standing > 5 minutes, sitting when reading, driving.     Signs and symptoms are consistent with diagnosis of cervical spine dysfunction/posture dysfunction. Pt and PT discussed evaluation findings, pathology, POC and HEP.  Pt voiced understanding and performs HEP correctly without reported pain. Skilled Physical Therapy is medically necessary to address the above impairments and reach functional goals.     OBJECTIVE:   Observation/Posture: slouch/flexed posture in sitting, standing, walking. FHP with forward and IR humerus, protracted scapulae, no scoliosis.  Neuro Screen: negative    Cervical spine AROM: Flexion: full  Extension: 30  Sidebending: R 15; L 15  Rotation: R 45; L 45  Upper cervical rotation R 45. L 20    Accessory motion: decreased at upper cervical and C/T junction  Palpation: negative    Strength:   UE/Scapular    UE myotomes at shoulder elbow and wrist symmetrical 4/5.    Decreased cranial and cervical  flexors     Flexibility:   UE/Scapular   Decreased pec and latissimus muscle length.  Decreased upper trapezius muscle length.       Special tests:   Full motion of median nerve ULTT. Negative upper cervical stability tests.     Today’s Treatment and Response:   Pt education was provided on exam findings, treatment diagnosis, treatment plan, expectations, and prognosis. Tx was posture correction to decrease upper cervical extension, FHP posture, flexed trunk, improve sitting and standing posture. Manual PT with grade 3,4  mobes for upper cervical L rotation followed by upper cervical PROM and AROM.  Pt performed HEP cranial flexion in supine with chin nod, progress to 10 sec holds x 6 reps followed by thoracic extension AROM with overpressure from UE's x 10 reps 2 set. HO issued for HEP.       Charges: PT Eval Low Complexity, TE 2      Total Timed Treatment: 20 min     Total Treatment Time: 45 min       PLAN OF CARE:    Goals: ( 8 visits) [employer] approved 8 sessions  -Improve cranial flexor strength to WNL,  no upper cervical extension sitting, neck pain reduced 2/10 or < with sitting and driving.   -Improve cervical rotation to 60* L and R so pt can turn head w/o limitations, operate car safely.   -Improve cervical extension to 60* or > so pt can look up into overhead shelves and cabinets w/o restrictions.  -improve upper quarter muscle length to WFL's to reduce flexed posture, neck and back pain reduced to 2/10 or less with daily activities.   -independent with progressive HEP    Frequency / Duration: Patient will be seen for 1-2 x/week or a total of 8 visits over a 90 day period. Treatment will include: Manual Therapy, Neuromuscular Re-education, Therapeutic Exercise, and Home Exercise Program instruction    Education or treatment limitation: None  Rehab Potential:good    Neck Disability Index Score  Score: 14 % (3/20/2024  8:57 AM)      Patient/Family/Caregiver was advised of these findings, precautions, and  treatment options and has agreed to actively participate in planning and for this course of care.    Thank you for your referral. If you have any questions, please contact me at Dept: [phone]    Sincerely,  Electronically signed by therapist: Jens Hale, PT           [employer] Cures Act Notice to Patient: Medical documents like this are made available to patients in the interest of transparency. However, be advised this is a medical document and it is intended as [id number] communication between your medical providers. This medical document may contain abbreviations, assessments, medical data, and results or other terms that are unfamiliar. Medical documents are intended to carry relevant information, facts as evident, and the clinical opinion of the practitioner. As such, this medical document may be written in language that appears blunt or direct. You are encouraged to contact your medical provider and/or [facility] Patient Experience if you have any questions about this medical document.

## (undated) NOTE — LETTER
ASTHMA ACTION PLAN for Cristin Orellana     : 3/7/1960     Date: 2023  Provider:  Magdi Flores DO  Phone for doctor or clinic: [facility], [address] [address] [facility], [address]  [place] [id number] [phone]    ACT Score: 23      You can use the colors of a traffic light to help learn about your asthma medicines. 1. Green - Go! % of Personal Best Peak Flow Use controller medicine. Breathing is good  No cough or wheeze  Can work and play Medicine How much to take When to take it    Avoid Triggers       2. Yellow - Caution. 50-79% Personal Best Peak  Flow. Use reliever medicine to keep an asthma attack from getting bad. Cough  Wheezing  Tight Chest  Wake up at night Medicine How much to take When to take it    Albuterol inhaler,  1 puffs every six hours as needed. Additional instructions         3. Red - Stop! Danger!  <50% Personal Best Peak  Flow. Take these medications until  Get help from a doctor   Medicine not helping  Breathing is hard and fast  Nose opens wide  Can't walk  Ribs show  Can't talk well Medicine How much to take When to take it    Go to the nearest Emergency Room/Department right now! Additional Instructions If your symptoms do not improve and you cannot contact your doctor, [facility] or call 911 immediately! [x] Asthma Action Plan reviewed with patient (and caregiver if necessary) and a copy of the plan was given to the patient/caregiver. [] Asthma Action Plan reviewed with patient (and caregiver if necessary) on the phone and mailed copy to patient or submitted via [address]      Signatures:  Provider  Magdi Flores DO   Patient Caretaker

## (undated) NOTE — LETTER
:  3/7/1960      To Whom It May Concern: This patient was seen in our hospital on 23- 23 . Work status: May return to work full-time, no restrictions    May return to work status per above effective 2023. If this office may be of further assistance, please do not hesitate to contact us.       Sincerely,        Tj Peterson MD

## (undated) NOTE — LETTER
09/04/19        Mandy Napoles  [address]      Dear Nelsy Ramires records indicate that you have outstanding lab work and or testing that was ordered for you and has not yet been completed:  Orders Placed This Encounte